# Patient Record
Sex: FEMALE | Race: WHITE | Employment: UNEMPLOYED | URBAN - METROPOLITAN AREA
[De-identification: names, ages, dates, MRNs, and addresses within clinical notes are randomized per-mention and may not be internally consistent; named-entity substitution may affect disease eponyms.]

---

## 2017-04-07 ENCOUNTER — HOSPITAL ENCOUNTER (INPATIENT)
Age: 62
LOS: 3 days | Discharge: NURSING, REHAB OR LONG-TERM CARE | DRG: 305 | End: 2017-04-10
Attending: EMERGENCY MEDICINE | Admitting: INTERNAL MEDICINE
Payer: MEDICARE

## 2017-04-07 ENCOUNTER — APPOINTMENT (OUTPATIENT)
Dept: CT IMAGING | Age: 62
DRG: 305 | End: 2017-04-07
Attending: PHYSICIAN ASSISTANT
Payer: MEDICARE

## 2017-04-07 DIAGNOSIS — E87.1 HYPONATREMIA: Primary | ICD-10-CM

## 2017-04-07 PROBLEM — I16.0 HYPERTENSIVE URGENCY: Status: ACTIVE | Noted: 2017-04-07

## 2017-04-07 LAB
ANION GAP BLD CALC-SCNC: 15 MMOL/L (ref 5–15)
APPEARANCE UR: CLEAR
BILIRUB UR QL: NEGATIVE
BUN BLD-MCNC: 4 MG/DL (ref 9–20)
CA-I BLD-MCNC: 1.18 MMOL/L (ref 1.12–1.32)
CHLORIDE BLD-SCNC: 87 MMOL/L (ref 98–107)
CO2 BLD-SCNC: 28 MMOL/L (ref 21–32)
COLOR UR: NORMAL
CREAT BLD-MCNC: 0.4 MG/DL (ref 0.6–1.3)
GLUCOSE BLD-MCNC: 113 MG/DL (ref 65–100)
GLUCOSE UR STRIP.AUTO-MCNC: NEGATIVE MG/DL
HCT VFR BLD CALC: 44 % (ref 35–47)
HGB BLD-MCNC: 15 GM/DL (ref 11.5–16)
HGB UR QL STRIP: NEGATIVE
KETONES UR QL STRIP.AUTO: NEGATIVE MG/DL
LEUKOCYTE ESTERASE UR QL STRIP.AUTO: NEGATIVE
NITRITE UR QL STRIP.AUTO: NEGATIVE
PH UR STRIP: 7.5 [PH] (ref 5–8)
POTASSIUM BLD-SCNC: 3.5 MMOL/L (ref 3.5–5.1)
PROT UR STRIP-MCNC: NEGATIVE MG/DL
SERVICE CMNT-IMP: ABNORMAL
SODIUM BLD-SCNC: 125 MMOL/L (ref 136–145)
SODIUM UR-SCNC: 25 MMOL/L
SP GR UR REFRACTOMETRY: 1.01 (ref 1–1.03)
UROBILINOGEN UR QL STRIP.AUTO: 0.2 EU/DL (ref 0.2–1)

## 2017-04-07 PROCEDURE — 80047 BASIC METABLC PNL IONIZED CA: CPT

## 2017-04-07 PROCEDURE — 96360 HYDRATION IV INFUSION INIT: CPT

## 2017-04-07 PROCEDURE — 74011250636 HC RX REV CODE- 250/636: Performed by: PHYSICIAN ASSISTANT

## 2017-04-07 PROCEDURE — 70450 CT HEAD/BRAIN W/O DYE: CPT

## 2017-04-07 PROCEDURE — 93005 ELECTROCARDIOGRAM TRACING: CPT

## 2017-04-07 PROCEDURE — 65660000000 HC RM CCU STEPDOWN

## 2017-04-07 PROCEDURE — 74011250636 HC RX REV CODE- 250/636: Performed by: INTERNAL MEDICINE

## 2017-04-07 PROCEDURE — 99284 EMERGENCY DEPT VISIT MOD MDM: CPT

## 2017-04-07 PROCEDURE — 81003 URINALYSIS AUTO W/O SCOPE: CPT | Performed by: PHYSICIAN ASSISTANT

## 2017-04-07 PROCEDURE — 84484 ASSAY OF TROPONIN QUANT: CPT | Performed by: INTERNAL MEDICINE

## 2017-04-07 PROCEDURE — 83930 ASSAY OF BLOOD OSMOLALITY: CPT | Performed by: INTERNAL MEDICINE

## 2017-04-07 PROCEDURE — 74011250637 HC RX REV CODE- 250/637: Performed by: INTERNAL MEDICINE

## 2017-04-07 PROCEDURE — 36415 COLL VENOUS BLD VENIPUNCTURE: CPT | Performed by: INTERNAL MEDICINE

## 2017-04-07 PROCEDURE — 84300 ASSAY OF URINE SODIUM: CPT | Performed by: PHYSICIAN ASSISTANT

## 2017-04-07 RX ORDER — CLORAZEPATE DIPOTASSIUM 3.75 MG/1
3.75 TABLET ORAL 3 TIMES DAILY
Status: DISCONTINUED | OUTPATIENT
Start: 2017-04-07 | End: 2017-04-10 | Stop reason: HOSPADM

## 2017-04-07 RX ORDER — MONTELUKAST SODIUM 10 MG/1
10 TABLET ORAL DAILY
Status: DISCONTINUED | OUTPATIENT
Start: 2017-04-08 | End: 2017-04-10 | Stop reason: HOSPADM

## 2017-04-07 RX ORDER — SODIUM CHLORIDE 0.9 % (FLUSH) 0.9 %
5-10 SYRINGE (ML) INJECTION AS NEEDED
Status: DISCONTINUED | OUTPATIENT
Start: 2017-04-07 | End: 2017-04-10 | Stop reason: HOSPADM

## 2017-04-07 RX ORDER — FAMOTIDINE 20 MG/1
20 TABLET, FILM COATED ORAL DAILY
Status: DISCONTINUED | OUTPATIENT
Start: 2017-04-08 | End: 2017-04-10 | Stop reason: HOSPADM

## 2017-04-07 RX ORDER — LORAZEPAM 0.5 MG/1
0.5 TABLET ORAL
Status: DISCONTINUED | OUTPATIENT
Start: 2017-04-07 | End: 2017-04-10 | Stop reason: HOSPADM

## 2017-04-07 RX ORDER — MUPIROCIN 20 MG/G
OINTMENT TOPICAL 2 TIMES DAILY
COMMUNITY
End: 2017-04-10

## 2017-04-07 RX ORDER — THERA TABS 400 MCG
1 TAB ORAL DAILY
Status: DISCONTINUED | OUTPATIENT
Start: 2017-04-08 | End: 2017-04-10 | Stop reason: HOSPADM

## 2017-04-07 RX ORDER — ACETAMINOPHEN 325 MG/1
325 TABLET ORAL
COMMUNITY

## 2017-04-07 RX ORDER — ATORVASTATIN CALCIUM 20 MG/1
20 TABLET, FILM COATED ORAL
Status: DISCONTINUED | OUTPATIENT
Start: 2017-04-07 | End: 2017-04-10 | Stop reason: HOSPADM

## 2017-04-07 RX ORDER — AMLODIPINE BESYLATE 5 MG/1
5 TABLET ORAL DAILY
Status: DISCONTINUED | OUTPATIENT
Start: 2017-04-07 | End: 2017-04-10

## 2017-04-07 RX ORDER — THERA TABS 400 MCG
1 TAB ORAL DAILY
COMMUNITY

## 2017-04-07 RX ORDER — ACETAMINOPHEN 325 MG/1
325 TABLET ORAL
Status: DISCONTINUED | OUTPATIENT
Start: 2017-04-07 | End: 2017-04-10 | Stop reason: HOSPADM

## 2017-04-07 RX ORDER — ENOXAPARIN SODIUM 100 MG/ML
40 INJECTION SUBCUTANEOUS EVERY 24 HOURS
Status: DISCONTINUED | OUTPATIENT
Start: 2017-04-07 | End: 2017-04-10 | Stop reason: HOSPADM

## 2017-04-07 RX ORDER — SODIUM CHLORIDE 9 MG/ML
1000 INJECTION, SOLUTION INTRAVENOUS CONTINUOUS
Status: DISCONTINUED | OUTPATIENT
Start: 2017-04-07 | End: 2017-04-08

## 2017-04-07 RX ORDER — PAROXETINE HYDROCHLORIDE 20 MG/1
20 TABLET, FILM COATED ORAL
Status: DISCONTINUED | OUTPATIENT
Start: 2017-04-07 | End: 2017-04-10 | Stop reason: HOSPADM

## 2017-04-07 RX ORDER — CETIRIZINE HCL 10 MG
10 TABLET ORAL DAILY
COMMUNITY

## 2017-04-07 RX ORDER — CETIRIZINE HCL 10 MG
10 TABLET ORAL DAILY
Status: DISCONTINUED | OUTPATIENT
Start: 2017-04-08 | End: 2017-04-10 | Stop reason: HOSPADM

## 2017-04-07 RX ORDER — BENZONATATE 100 MG/1
100 CAPSULE ORAL
COMMUNITY
End: 2017-04-07

## 2017-04-07 RX ORDER — LORAZEPAM 0.5 MG/1
0.5 TABLET ORAL 2 TIMES DAILY
Status: DISCONTINUED | OUTPATIENT
Start: 2017-04-07 | End: 2017-04-08

## 2017-04-07 RX ORDER — SODIUM CHLORIDE 0.9 % (FLUSH) 0.9 %
5-10 SYRINGE (ML) INJECTION EVERY 8 HOURS
Status: DISCONTINUED | OUTPATIENT
Start: 2017-04-07 | End: 2017-04-10 | Stop reason: HOSPADM

## 2017-04-07 RX ORDER — RANITIDINE 150 MG/1
150 CAPSULE ORAL
COMMUNITY

## 2017-04-07 RX ORDER — LISINOPRIL 5 MG/1
5 TABLET ORAL DAILY
Status: DISCONTINUED | OUTPATIENT
Start: 2017-04-07 | End: 2017-04-10

## 2017-04-07 RX ORDER — HYDRALAZINE HYDROCHLORIDE 20 MG/ML
20 INJECTION INTRAMUSCULAR; INTRAVENOUS
Status: DISCONTINUED | OUTPATIENT
Start: 2017-04-07 | End: 2017-04-08

## 2017-04-07 RX ORDER — METOPROLOL SUCCINATE 25 MG/1
25 TABLET, EXTENDED RELEASE ORAL DAILY
Status: DISCONTINUED | OUTPATIENT
Start: 2017-04-08 | End: 2017-04-10 | Stop reason: HOSPADM

## 2017-04-07 RX ORDER — LORAZEPAM 0.5 MG/1
0.5 TABLET ORAL 2 TIMES DAILY
Status: ON HOLD | COMMUNITY
End: 2017-04-09

## 2017-04-07 RX ADMIN — SODIUM CHLORIDE 1000 ML: 900 INJECTION, SOLUTION INTRAVENOUS at 19:01

## 2017-04-07 RX ADMIN — Medication 10 ML: at 22:06

## 2017-04-07 RX ADMIN — ATORVASTATIN CALCIUM 20 MG: 20 TABLET, FILM COATED ORAL at 22:03

## 2017-04-07 RX ADMIN — LISINOPRIL 5 MG: 5 TABLET ORAL at 18:59

## 2017-04-07 RX ADMIN — ACETAMINOPHEN 325 MG: 325 TABLET, FILM COATED ORAL at 23:05

## 2017-04-07 RX ADMIN — LORAZEPAM 0.5 MG: 0.5 TABLET ORAL at 22:03

## 2017-04-07 RX ADMIN — SODIUM CHLORIDE 1000 ML: 900 INJECTION, SOLUTION INTRAVENOUS at 22:09

## 2017-04-07 RX ADMIN — AMLODIPINE BESYLATE 5 MG: 5 TABLET ORAL at 19:00

## 2017-04-07 RX ADMIN — ENOXAPARIN SODIUM 40 MG: 40 INJECTION SUBCUTANEOUS at 22:03

## 2017-04-07 RX ADMIN — HYDRALAZINE HYDROCHLORIDE 20 MG: 20 INJECTION INTRAMUSCULAR; INTRAVENOUS at 22:03

## 2017-04-07 RX ADMIN — PAROXETINE HYDROCHLORIDE 20 MG: 20 TABLET, FILM COATED ORAL at 22:03

## 2017-04-07 NOTE — IP AVS SNAPSHOT
Current Discharge Medication List  
  
START taking these medications Dose & Instructions Dispensing Information Comments Morning Noon Evening Bedtime  
 amLODIPine 10 mg tablet Commonly known as:  Parvin Jimenes Your last dose was: Your next dose is:    
   
   
 Dose:  10 mg Take 1 Tab by mouth daily. Hold for SBP<100 or DBP<50 Quantity:  30 Tab Refills:  0 CONTINUE these medications which have CHANGED Dose & Instructions Dispensing Information Comments Morning Noon Evening Bedtime * LORazepam 0.5 mg tablet Commonly known as:  ATIVAN What changed:  when to take this Your last dose was: Your next dose is:    
   
   
 Dose:  0.5 mg Take 1 Tab by mouth three (3) times daily. Max Daily Amount: 1.5 mg.  
 Quantity:  90 Tab Refills:  0  
     
   
   
   
  
 * LORazepam 0.5 mg tablet Commonly known as:  ATIVAN What changed: You were already taking a medication with the same name, and this prescription was added. Make sure you understand how and when to take each. Your last dose was: Your next dose is:    
   
   
 Dose:  0.5 mg Take 1 Tab by mouth two (2) times daily as needed. Max Daily Amount: 1 mg. Quantity:  30 Tab Refills:  0  
     
   
   
   
  
 * Notice: This list has 2 medication(s) that are the same as other medications prescribed for you. Read the directions carefully, and ask your doctor or other care provider to review them with you. CONTINUE these medications which have NOT CHANGED Dose & Instructions Dispensing Information Comments Morning Noon Evening Bedtime  
 acetaminophen 325 mg tablet Commonly known as:  TYLENOL Your last dose was: Your next dose is:    
   
   
 Dose:  325 mg Take 325 mg by mouth daily as needed for Pain. Refills:  0  
     
   
   
   
  
 cetirizine 10 mg tablet Commonly known as:  ZYRTEC  
   
 Your last dose was: Your next dose is:    
   
   
 Dose:  10 mg Take 10 mg by mouth daily. Refills:  0  
     
   
   
   
  
 clorazepate 7.5 mg tablet Commonly known as:  TRANXENE Your last dose was: Your next dose is:    
   
   
 Dose:  3.75 mg Take 3.75 mg by mouth three (3) times daily. Refills:  0 LIVALO 2 mg tablet Generic drug:  pitavastatin Your last dose was: Your next dose is:    
   
   
 Dose:  2 mg Take 2 mg by mouth nightly. Refills:  0 NEILMED SINUS RINSE COMPLETE NA Your last dose was: Your next dose is:    
   
   
 by Nasal route two (2) times a day. Refills:  0 PAXIL 20 mg tablet Generic drug:  PARoxetine Your last dose was: Your next dose is:    
   
   
 Dose:  20 mg Take 20 mg by mouth nightly. Refills:  0  
     
   
   
   
  
 raNITIdine hcl 150 mg capsule Your last dose was: Your next dose is:    
   
   
 Dose:  150 mg Take 150 mg by mouth nightly. Refills:  0 SINGULAIR 10 mg tablet Generic drug:  montelukast  
   
Your last dose was: Your next dose is:    
   
   
 Dose:  10 mg Take 10 mg by mouth daily. Refills:  0  
     
   
   
   
  
 therapeutic multivitamin tablet Commonly known as:  [facility] Your last dose was: Your next dose is:    
   
   
 Dose:  1 Tab Take 1 Tab by mouth daily. Refills:  0  
     
   
   
   
  
 TOPROL XL 25 mg XL tablet Generic drug:  metoprolol succinate Your last dose was: Your next dose is:    
   
   
 Dose:  25 mg Take 25 mg by mouth daily. Refills:  0 STOP taking these medications   
 mupirocin 2 % ointment Commonly known as:  [facility] Where to Get Your Medications Information on where to get these meds will be given to you by the nurse or doctor. ! Ask your nurse or doctor about these medications  
  amLODIPine 10 mg tablet LORazepam 0.5 mg tablet LORazepam 0.5 mg tablet

## 2017-04-07 NOTE — BSMART NOTE
Comprehensive Assessment Form Part 1      Section I - Disposition    Axis I - Generalized Anxiety D/O   Axis II - 799. 9(Dependent PD traits)  Axis III -   Past Medical History:   Diagnosis Date    Arthritis     Asthma     Hypertension     Psychiatric disorder     anxiety and panic attacks     Axis IV - Social, Medical  [place] V - 55      The Medical Doctor to Psychiatrist conference was not completed. The Medical Doctor is in agreement with Psychiatrist disposition because of (reason) patient doesn't warrant inpatient care. The plan is d/c home and f/u with Dr. Ayala Benson on 4/11 @ 3p. Provided various strategies to assist in managing her anxiety and encouraged  to continue working as planned and giving patient resources to care for herself during this time. Advised patietn and  to not make ANY additional changes to Rx without first talking to Dr. Ayala Benson as they are getting directions from others and getting confused. Pt's biggest issues seem linked to anxiety and fear of being alone. The on-call Psychiatrist consulted was Dr. Shefali Claudio. The admitting Psychiatrist will be Dr. Shefali Claudio. The admitting Diagnosis is NA. The Payor source is ARON Aleman Section II - Integrated Summary  Summary:  Patient presents to ER seeking help for her anxiety. She's visited [facility] 2x in the past 2wks and has been to [facility]x due to her anxiety and weakness. Goes in depth discussing her various physical complaints including lightheadedness, weakness, increasing urinary frequency, increasing anxiety, SOB, and poor balance. She believes this is all due to recent changes in her Rx. Shared that she saw Dr. Manfred Anthony for over 10yrs and that upon his long term she had to find another psych. Saw Dr. Ayala Benson for the first time approx 2wks ago. At that time her Tranxene was reduced(with plan to d/c), her Ativan was reduced to .5mg BID from 1mg.  Shared that in addition she was Rx Depakote 125mg qhs \"but I can't take that because it has made me dizzy\" and Paxil 20mg. Patient reports that she's unable to care for herself at home now due to the above mentioned symptoms. Says her  can't care for her as he has his own health issues and has a job. Talked with patient's  who shared that he's fearful of leaving her home alone due to fear of falling. Shared that he's been ensuring she takes Rx as ordered but feels that she needs to change dose saying \"the decrease happened to fast\". She denies any SI/HI. She denies any AH/VH. Patient seems fixated on \"changes happening too fast-maybe I need a new Psych\". In addition she talks about concerns regarding meal preparation \"I'm too weak\" and what to do when her  goes to work. The patienthas demonstrated mental capacity to provide informed consent. The information is given by the patient and spouse/SO. The Chief Complaint is \"I'm just anxious and weak\". The Precipitant Factors are changes in Rx, limited coping skills. Previous Hospitalizations: none  The patient has not previously been in restraints. Current Psychiatrist and/or  is Dr. Orion Osorio. Lethality Assessment:    The potential for suicide noted by the following: not noted . The potential for homicide is not noted. The patient has not been a perpetrator of sexual or physical abuse. There are not pending charges. The patient is not felt to be at risk for self harm or harm to others. The attending nurse was advised . Section III - Psychosocial  The patient's overall mood and attitude is cooperative. Feelings of helplessness and hopelessness are not observed. Generalized anxiety is observed by patient is fixated on somatic symtoms, fears, and health. Panic is observed by patient report. Phobias are not observed. Obsessive compulsive tendencies are not observed. Section IV - Mental Status Exam  The patient's appearance shows no evidence of impairment.   The patient's behavior shows no evidence of impairment. The patient is oriented to time, place, person and situation. The patient's speech shows no evidence of impairment. The patient's mood is anxious. The range of affect shows no evidence of impairment. The patient's thought content demonstrates no evidence of impairment. The thought process shows no evidence of impairment. The patient's perception shows no evidence of impairment. The patient's memory shows no evidence of impairment. The patient's appetite shows no evidence of impairment. The patient's sleep shows no evidence of impairment. The patient's insight shows no evidence of impairment. The patient's judgement shows no evidence of impairment. Section V - Substance Abuse  The patient is not using substances. Section VI - Living Arrangements  The patient is . The spouses approximate age is 63's and appears to be in fair health. The patient lives with a spouse. The patient has no children. The patient does plan to return home upon discharge. The patient does not have legal issues pending. The patient's source of income comes from social security. Anabaptist and cultural practices have not been voiced at this time. The patient's greatest support comes from  and this person will be involved with the treatment. The patient has not been in an event described as horrible or outside the realm of ordinary life experience either currently or in the past.  The patient has not been a victim of sexual/physical abuse. Section VII - Other Areas of Clinical Concern  The highest grade achieved is unk with the overall quality of school experience being described as NA. The patient is currently unemployed and speaks [place] as a primary language. The patient has no communication impairments affecting communication. The patient's preference for learning can be described as: can read and write adequately.   The patient's hearing is normal.  The patient's vision is normal.      Jorge Luis Cam, LPC

## 2017-04-07 NOTE — H&P
History & Physical    Primary Care Provider: MICHELLE Cardenas  Source of Information: Patient and chart      Prior Psychiatrist:  Susan Starks  Current    Psychiatrist :Najma Granado  History of Presenting Illness:     Loulou Corcoran is a 62 y.o. female who presents with: dizziness and  Elevated blood pressure, pt feels that her main problem is anxiety 2n2 to her most recent psychiatrist reducing her anxiety medicine (  Pt was on ativan 1 mg bid now 0.5 mg bid and was on Tranxene 7.5 mg tid and placed on 3.75 mg tid.  )  Pt w/o cp, sob but notes racing of heart occassionally. Mendel [place] no n/v/d/fever but did have loos stools x 1 today. No LOC, no seizure, no cp, no sob. Review of Systems:    ·  constitutional Dizziness, weakness. No headache ·  genitourinary Bladder spasm     ·  eyes occ blurry ·  musculoskeletal Rt ankle pain off and on    ·  ENT: --ears neg   ·  neurologic  neg      --nose/sinuses neg   ·  psychiatric Anxiety       --mouth/thorat neg   ·  endocrine neg      --neck neg   ·  peripheral vascular neg     ·  respiratory neg   ·  Skin/breast neg     ·  cardiac occ races ·  Hematologic/lymph neg     ·  gastroentestinal Neg  ·  Allergy/immunologic neg              Past, Family, and/or Social History [facility])    Past Medical History:   Diagnosis Date    Arthritis     Asthma     Hypertension     Psychiatric disorder     anxiety and panic attacks      Past Surgical History:   Procedure Laterality Date    [place] INCISE/CRYOSURG LESN BLADDER       Prior to Admission medications    Medication Sig Start Date End Date Taking? Authorizing Provider   LORazepam (ATIVAN) 0.5 mg tablet Take 0.5 mg by mouth two (2) times a day. Yes Historical Provider   pitavastatin (LIVALO) 2 mg tablet Take 2 mg by mouth nightly. Yes Historical Provider   mupirocin (BACTROBAN) 2 % ointment by Both Nostrils route two (2) times a day.    Yes Historical Provider   SOD CHLOR,BICARB/SQUEEZ BOTTLE (NEILMED SINUS RINSE COMPLETE NA) by Nasal route two (2) times a day. Yes Historical Provider   acetaminophen (TYLENOL) 325 mg tablet Take 325 mg by mouth daily as needed for Pain. Yes Historical Provider   raNITIdine hcl 150 mg capsule Take 150 mg by mouth nightly. Yes Historical Provider   cetirizine (ZYRTEC) 10 mg tablet Take 10 mg by mouth daily. Yes Historical Provider   therapeutic multivitamin (THERAGRAN) tablet Take 1 Tab by mouth daily. Yes Historical Provider   PARoxetine (PAXIL) 20 mg tablet Take 20 mg by mouth nightly. Yes Jaylan Lopez MD   montelukast (SINGULAIR) 10 mg tablet Take 10 mg by mouth daily. Yes Jaylan Lopez MD   metoprolol succinate (TOPROL XL) 25 mg XL tablet Take 25 mg by mouth daily. Yes Jaylan Lopez MD   clorazepate (TRANXENE) 7.5 mg tablet Take 3.75 mg by mouth three (3) times daily. Yes Jaylan Lopez MD     Allergies   Allergen Reactions    Alcohol Other (comments)     Pt stated it would knock her on the floor, pt stated she tested positive     Tetracycline Other (comments)     vomiting      History reviewed. No pertinent family history. SOCIAL HISTORY:  Patient resides:  Independently x   Assisted Living    SNF    With family care       Smoking history:   None x   Former    Chronic      Alcohol history:   None x   Social    Chronic      Patient ambulates:  Independently x   With Cane x   With [employer] walker    With WC             Objective:       Physical Exam:           Visit Vitals    BP (!) 131/102    Pulse 77    Temp 98.6 °F (37 °C)    Resp 15    Ht 5' 2\" (1.575 m)    Wt 50.9 kg (112 lb 3.4 oz)    SpO2 96%    BMI 20.52 kg/m2      O2 Device: Room air             Data Review:     Recent Days:  No results for input(s): WBC, HGB, HCT, PLT, HGBEXT, HCTEXT, PLTEXT in the last 72 hours. No results for input(s): NA, K, CL, CO2, GLU, BUN, CREA, CA, MG, PHOS, ALB, TBIL, TBILI, SGOT, ALT, INR in the last 72 hours.     No lab exists for component: INREXT  No results for input(s): PH, PCO2, PO2, HCO3, FIO2 in the last 72 hours. 24 Hour Results:  Recent Results (from the past 24 hour(s))   POC CHEM8    Collection Time: 04/07/17  3:43 PM   Result Value Ref Range    Calcium, ionized (POC) 1.18 1.12 - 1.32 MMOL/L    Sodium (POC) 125 (L) 136 - 145 MMOL/L    Potassium (POC) 3.5 3.5 - 5.1 MMOL/L    Chloride (POC) 87 (L) 98 - 107 MMOL/L    CO2 (POC) 28 21 - 32 MMOL/L    Anion gap (POC) 15 5 - 15 mmol/L    Glucose (POC) 113 (H) 65 - 100 MG/DL    BUN (POC) 4 (L) 9 - 20 MG/DL    Creatinine (POC) 0.4 (L) 0.6 - 1.3 MG/DL    GFR-AA (POC) >60 >60 ml/min/1.73m2    GFR, non-AA (POC) >60 >60 ml/min/1.73m2    Hemoglobin (POC) 15.0 11.5 - 16.0 GM/DL    Hematocrit (POC) 44 35.0 - 47.0 %    Comment Comment Not Indicated. URINALYSIS W/ RFLX MICROSCOPIC    Collection Time: 04/07/17  5:09 PM   Result Value Ref Range    Color YELLOW/STRAW      Appearance CLEAR CLEAR      Specific gravity 1.007 1.003 - 1.030      pH (UA) 7.5 5.0 - 8.0      Protein NEGATIVE  NEG mg/dL    Glucose NEGATIVE  NEG mg/dL    Ketone NEGATIVE  NEG mg/dL    Bilirubin NEGATIVE  NEG      Blood NEGATIVE  NEG      Urobilinogen 0.2 0.2 - 1.0 EU/dL    Nitrites NEGATIVE  NEG      Leukocyte Esterase NEGATIVE  NEG             Old records: Old records reviewed: = yes ;    Assessment: and Plan       Pt with new problem to me  y   Pt and her problems are new to me  y   Life threatening issue is  y            Impression    · Hypertensive urgency ; Increased bp management meds and iv meds as needed. · Increased dizziness felt 2nd to above. Will need PT/OT eval    · Chronic anxiety on a newed regimen. Pt feels inadequate     For psychiatry eval    · Irritable bladder   Cont home detrol.               Code: ull   PPX:  Lovenox   Ambulation status PTA:  variable up with walker but had to craw recently on floor  Expected DC:  3 days   Come form:  Home   Will need PT:  y  Will need OT: y  Will need Speech: n  Will need Case: y               Signed By: Tika Christopher MD     April 7, 2017

## 2017-04-07 NOTE — ED NOTES
Pt resting on stretcher waiting for food tray. No s/s of acute distress. Call bell within reach. Will continue to monitor.

## 2017-04-07 NOTE — PROGRESS NOTES
Admission Medication Reconciliation:    Information obtained from: Patient, Medication bottles, Rx Query    Significant PMH/Disease States:   Past Medical History:   Diagnosis Date    Arthritis     Asthma     Hypertension     Psychiatric disorder     anxiety and panic attacks       Chief Complaint for this Admission:  Anxiety, panic attack    Allergies:  Alcohol and Tetracycline    Prior to Admission Medications:   Prior to Admission Medications   Prescriptions Last Dose Informant Patient Reported? Taking? LORazepam (ATIVAN) 0.5 mg tablet   Yes Yes   Sig: Take 0.5 mg by mouth two (2) times a day. PARoxetine (PAXIL) 20 mg tablet   Yes Yes   Sig: Take 20 mg by mouth nightly. SOD CHLOR,BICARB/SQUEEZ BOTTLE (NEILMED SINUS RINSE COMPLETE NA)   Yes Yes   Sig: by Nasal route two (2) times a day. acetaminophen (TYLENOL) 325 mg tablet   Yes Yes   Sig: Take 325 mg by mouth daily as needed for Pain. cetirizine (ZYRTEC) 10 mg tablet   Yes Yes   Sig: Take 10 mg by mouth daily. clorazepate (TRANXENE) 7.5 mg tablet   Yes Yes   Sig: Take 3.75 mg by mouth three (3) times daily. metoprolol succinate (TOPROL XL) 25 mg XL tablet   Yes Yes   Sig: Take 25 mg by mouth daily. montelukast (SINGULAIR) 10 mg tablet   Yes Yes   Sig: Take 10 mg by mouth daily. mupirocin (BACTROBAN) 2 % ointment   Yes Yes   Sig: by Both Nostrils route two (2) times a day. pitavastatin (LIVALO) 2 mg tablet   Yes Yes   Sig: Take 2 mg by mouth nightly. raNITIdine hcl 150 mg capsule   Yes Yes   Sig: Take 150 mg by mouth nightly. therapeutic multivitamin [facility]) tablet   Yes Yes   Sig: Take 1 Tab by mouth daily. Facility-Administered Medications: None         Comments/Recommendations: PTA med list updated via information provided by the patient, medications, and Rx Query. 1) Per patient, her psychiatrist wanted to wean her off clorazepate (was previously on 7.5 mg TID).   She is currently taking 3.75 mg TID but said she is unable to wean off completely due to her anxiety and said her psychiatrist will not give her a refill until she sees them. 2) Patient's bottle of metoprolol XL states 2 tabs PO every day but the patient states that is incorrect and she only takes 2 tablet PO every day.

## 2017-04-07 NOTE — ED PROVIDER NOTES
HPI Comments: 11year-old female history of anxiety, GERD who presents with dizziness and difficulty walking. Patient notes dizziness improves when she is still but when she attempts to move she becomes quite dizzy she's been crawling on the ground at home. As in the side patient also requesting someone to talk to her psychiatrist as her current anxiety regimen has been changed in recent weeks has been cutting back on her Ativan and Livalo. Patient is a 62 y.o. female presenting with dizziness. Dizziness          Past Medical History:   Diagnosis Date    Arthritis     Asthma     Hypertension     Psychiatric disorder     anxiety and panic attacks       Past Surgical History:   Procedure Laterality Date    [place] INCISE/CRYOSURG LESN BLADDER           History reviewed. No pertinent family history. Social History     Social History    Marital status:      Spouse name: N/A    Number of children: N/A    Years of education: N/A     Occupational History    Not on file. Social History Main Topics    Smoking status: Never Smoker    Smokeless tobacco: Not on file    Alcohol use No    Drug use: Not on file    Sexual activity: Not on file     Other Topics Concern    Not on file     Social History Narrative         ALLERGIES: Alcohol and Tetracycline    Review of Systems   Neurological: Positive for dizziness. Vitals:    04/07/17 1443   BP: (!) 161/96   Pulse: 76   Resp: 16   Temp: 97.7 °F (36.5 °C)   SpO2: 100%   Weight: 50.9 kg (112 lb 3.4 oz)   Height: 5' 2\" (1.575 m)            Physical Exam   Constitutional: She is oriented to person, place, and time. She appears well-developed and well-nourished. HENT:   Head: Normocephalic and atraumatic. Eyes: Conjunctivae and EOM are normal. Pupils are equal, round, and reactive to light. Neck: Normal range of motion. Neck supple. Cardiovascular: Normal rate, regular rhythm and normal heart sounds. No murmur heard.   Pulmonary/Chest: Effort normal and breath sounds normal.   Abdominal: Soft. There is no tenderness. Musculoskeletal: Normal range of motion. She exhibits no edema or tenderness. Neurological: She is alert and oriented to person, place, and time. Skin: Skin is warm and dry. Psychiatric: She has a normal mood and affect. Nursing note and vitals reviewed. MDM  Number of Diagnoses or Management Options  Hyponatremia:   Diagnosis management comments: 63 y/o F with dizziness - appears postural - Na 125     Admit symptomatic hyponatremia     ED EKG interpretation:  Rhythm: normal sinus rhythm; and regular . Rate (approx.): 76; Axis: normal; P wave: normal; QRS interval: normal ; ST/T wave: normal; in  Lead: ; Other findings: normal. This EKG was interpreted by MICHELLE Romero,ED Provider.       ED Course       Procedures

## 2017-04-07 NOTE — IP AVS SNAPSHOT
[address] [facility] [address] [facility] 
[phone] Patient: Angela Roach MRN: [id number] AHX:[birth year] You are allergic to the following Allergen Reactions Sulfa (Sulfonamide Antibiotics) Other (comments) Body aches Alcohol Other (comments) Pt stated it would knock her on the floor, pt stated she tested positive Tetracycline Other (comments)  
 vomiting Recent Documentation Height Weight BMI OB Status Smoking Status 1.575 m 50.5 kg 20.36 kg/m2 Hysterectomy Never Smoker Emergency Contacts Name Discharge Info Relation Home Work Mobile Luzma Alvarez CAREGIVER [3] Spouse [3] [phone] Caty Barragan  Other Relative [6]   [phone] About your hospitalization You were admitted on:  April 7, 2017 You last received care in the[address] [facility] You were discharged on:  April 10, 2017 Unit phone number:  [phone] Why you were hospitalized Your primary diagnosis was:  Hypertensive Urgency Your diagnoses also included:  Anxiety Associated With Depression, Dizziness, Panic Anxiety Syndrome, Irritable Bladder, Hypokalemia, Hyponatremia Providers Seen During Your Hospitalizations Provider Role Specialty Primary office phone Latricia Gustafson MD Attending Provider Emergency Medicine [phone] Vani Weiss MD Attending Provider Internal Medicine [phone] Catherine Mahajan MD Attending Provider Internal Medicine [phone] Your Primary Care Physician (PCP) Primary Care Physician Office Phone Office Fax [phone], [place] [address] [phone] Follow-up Information Follow up With Details Comments Contact Info Ally Akers [place], [place] In 1 week hospital follow up [address] [facility] [address] [facility] [address] 
[phone] Monica Douglas MD In 1 week hospital follow up [address] [place] [address] 
[phone] Aneesh Starr MD  hospital follow up if want to change psychiatrist [address] [facility] [address] [facility] [place] 
[phone] Current Discharge Medication List  
  
START taking these medications Dose & Instructions Dispensing Information Comments Morning Noon Evening Bedtime  
 amLODIPine 10 mg tablet Commonly known as:  Massiel Byrd Your last dose was: Your next dose is:    
   
   
 Dose:  10 mg Take 1 Tab by mouth daily. Hold for SBP<100 or DBP<50 Quantity:  30 Tab Refills:  0 CONTINUE these medications which have CHANGED Dose & Instructions Dispensing Information Comments Morning Noon Evening Bedtime * LORazepam 0.5 mg tablet Commonly known as:  ATIVAN What changed:  when to take this Your last dose was: Your next dose is:    
   
   
 Dose:  0.5 mg Take 1 Tab by mouth three (3) times daily. Max Daily Amount: 1.5 mg.  
 Quantity:  90 Tab Refills:  0  
     
   
   
   
  
 * LORazepam 0.5 mg tablet Commonly known as:  ATIVAN What changed: You were already taking a medication with the same name, and this prescription was added. Make sure you understand how and when to take each. Your last dose was: Your next dose is:    
   
   
 Dose:  0.5 mg Take 1 Tab by mouth two (2) times daily as needed. Max Daily Amount: 1 mg. Quantity:  30 Tab Refills:  0  
     
   
   
   
  
 * Notice: This list has 2 medication(s) that are the same as other medications prescribed for you. Read the directions carefully, and ask your doctor or other care provider to review them with you. CONTINUE these medications which have NOT CHANGED Dose & Instructions Dispensing Information Comments Morning Noon Evening Bedtime  
 acetaminophen 325 mg tablet Commonly known as:  TYLENOL Your last dose was: Your next dose is:    
   
   
 Dose:  325 mg Take 325 mg by mouth daily as needed for Pain. Refills:  0  
     
   
   
   
  
 cetirizine 10 mg tablet Commonly known as:  ZYRTEC Your last dose was: Your next dose is:    
   
   
 Dose:  10 mg Take 10 mg by mouth daily. Refills:  0  
     
   
   
   
  
 clorazepate 7.5 mg tablet Commonly known as:  TRANXENE Your last dose was: Your next dose is:    
   
   
 Dose:  3.75 mg Take 3.75 mg by mouth three (3) times daily. Refills:  0 LIVALO 2 mg tablet Generic drug:  pitavastatin Your last dose was: Your next dose is:    
   
   
 Dose:  2 mg Take 2 mg by mouth nightly. Refills:  0 NEILMED SINUS RINSE COMPLETE NA Your last dose was: Your next dose is:    
   
   
 by Nasal route two (2) times a day. Refills:  0 PAXIL 20 mg tablet Generic drug:  PARoxetine Your last dose was: Your next dose is:    
   
   
 Dose:  20 mg Take 20 mg by mouth nightly. Refills:  0  
     
   
   
   
  
 raNITIdine hcl 150 mg capsule Your last dose was: Your next dose is:    
   
   
 Dose:  150 mg Take 150 mg by mouth nightly. Refills:  0 SINGULAIR 10 mg tablet Generic drug:  montelukast  
   
Your last dose was: Your next dose is:    
   
   
 Dose:  10 mg Take 10 mg by mouth daily. Refills:  0  
     
   
   
   
  
 therapeutic multivitamin tablet Commonly known as:  [facility] Your last dose was: Your next dose is:    
   
   
 Dose:  1 Tab Take 1 Tab by mouth daily. Refills:  0  
     
   
   
   
  
 TOPROL XL 25 mg XL tablet Generic drug:  metoprolol succinate Your last dose was: Your next dose is:    
   
   
 Dose:  25 mg Take 25 mg by mouth daily. Refills:  0 STOP taking these medications   
 mupirocin 2 % ointment Commonly known as:  [facility] Where to Get Your Medications Information on where to get these meds will be given to you by the nurse or doctor. ! Ask your nurse or doctor about these medications  
  amLODIPine 10 mg tablet LORazepam 0.5 mg tablet LORazepam 0.5 mg tablet Discharge Instructions ADDITIONAL CARE RECOMMENDATIONS:  
1. Take medications as prescribed. 2. Keep appointments as recommended/scheduled. 3. Please talk to your psychiatrist about adjusting your medications. The psychiatrist in the hospital recommended weaning off Paxil while starting and titrating up Zoloft on an outpatient basis. 4. Your psychiatric medications may be contributing to your low sodium levels due to dry mouth causing increased drinking of water. Please talk to your psychiatrist about adjusting your medications if needed. 5. Please check a basic metabolic panel (BMP) in 1 week. DIET: Low fat, Low cholesterol; please restrict water intake to less than 1500 ml per day ACTIVITY: PT/OT Eval and Treat WOUND CARE: none EQUIPMENT needed: as per PT/OT Anxiety Disorder: Care Instructions Your Care Instructions Anxiety is a normal reaction to stress. Difficult situations can cause you to have symptoms such as sweaty palms and a nervous feeling. In an anxiety disorder, the symptoms are far more severe. Constant worry, muscle tension, trouble sleeping, nausea and diarrhea, and other symptoms can make normal daily activities difficult or impossible. These symptoms may occur for no reason, and they can affect your work, school, or social life. Medicines, counseling, and self-care can all help. Follow-up care is a key part of your treatment and safety. Be sure to make and go to all appointments, and call your doctor if you are having problems.  It's also a good idea to know your test results and keep a list of the medicines you take. How can you care for yourself at home? · Take medicines exactly as directed. Call your doctor if you think you are having a problem with your medicine. · Go to your counseling sessions and follow-up appointments. · Recognize and accept your anxiety. Then, when you are in a situation that makes you anxious, say to yourself, \"This is not an emergency. I feel uncomfortable, but I am not in danger. I can keep going even if I feel anxious. \" · Be kind to your body: ¨ Relieve tension with exercise or a massage. ¨ Get enough rest. 
¨ Avoid alcohol, caffeine, nicotine, and illegal drugs. They can increase your anxiety level and cause sleep problems. ¨ Learn and do relaxation techniques. See below for more about these techniques. · Engage your mind. Get out and do something you enjoy. Go to a funny movie, or take a walk or hike. Plan your day. Having too much or too little to do can make you anxious. · Keep a record of your symptoms. Discuss your fears with a good friend or family member, or join a support group for people with similar problems. Talking to others sometimes relieves stress. · Get involved in social groups, or volunteer to help others. Being alone sometimes makes things seem worse than they are. · Get at least 30 minutes of exercise on most days of the week to relieve stress. Walking is a good choice. You also may want to do other activities, such as running, swimming, cycling, or playing tennis or team sports. Relaxation techniques Do relaxation exercises 10 to 20 minutes a day. You can play soothing, relaxing music while you do them, if you wish. · Tell others in your house that you are going to do your relaxation exercises. Ask them not to disturb you. · Find a comfortable place, away from all distractions and noise. · Lie down on your back, or sit with your back straight. · Focus on your breathing. Make it slow and steady. · Breathe in through your nose. Breathe out through either your nose or mouth. · Breathe deeply, filling up the area between your navel and your rib cage. Breathe so that your belly goes up and down. · Do not hold your breath. · Breathe like this for 5 to 10 minutes. Notice the feeling of calmness throughout your whole body. As you continue to breathe slowly and deeply, relax by doing the following for another 5 to 10 minutes: · Tighten and relax each muscle group in your body. You can begin at your toes and work your way up to your head. · Imagine your muscle groups relaxing and becoming heavy. · Empty your mind of all thoughts. · Let yourself relax more and more deeply. · Become aware of the state of calmness that surrounds you. · When your relaxation time is over, you can bring yourself back to alertness by moving your fingers and toes and then your hands and feet and then stretching and moving your entire body. Sometimes people fall asleep during relaxation, but they usually wake up shortly afterward. · Always give yourself time to return to full alertness before you drive a car or do anything that might cause an accident if you are not fully alert. Never play a relaxation tape while you drive a car. When should you call for help? Call 911 anytime you think you may need emergency care. For example, call if: 
· You feel you cannot stop from hurting yourself or someone else. Keep the numbers for these national suicide hotlines: 9-968-202-TALK (0-257.909.5828) and 6-670-EJOEKMI (8-242.629.9773). If you or someone you know talks about suicide or feeling hopeless, get help right away. Watch closely for changes in your health, and be sure to contact your doctor if: 
· You have anxiety or fear that affects your life. · You have symptoms of anxiety that are new or different from those you had before. Where can you learn more? Go to http://lucho-sarkis.info/. Enter P754 in the search box to learn more about \"Anxiety Disorder: Care Instructions. \" Current as of: July 26, 2016 Content Version: 11.2 © 4770-1023 [employer]iModel. Care instructions adapted under license by [employer] (which disclaims liability or warranty for this information). If you have questions about a medical condition or this instruction, always ask your healthcare professional. [address] any warranty or liability for your use of this information. Hyponatremia: Care Instructions Your Care Instructions Hyponatremia (say \"gq-mh-leg-TREE-brittany-uh\") means that you don't have enough sodium in your blood. It can cause nausea, vomiting, and headaches. Or you may not feel hungry. In serious cases, it can cause seizures, a coma, or even death. Hyponatremia is not a disease. It is a problem caused by something else, such as medicines or exercising for a long time in hot weather. You can get hyponatremia if you lose a lot of fluids and then you drink a lot of water or other liquids that don't have much sodium. You can also get it if you have kidney, liver, heart, or other health problems. Treatment is focused on getting your sodium levels back to normal. 
Follow-up care is a key part of your treatment and safety. Be sure to make and go to all appointments, and call your doctor if you are having problems. It's also a good idea to know your test results and keep a list of the medicines you take. How can you care for yourself at home? · If your doctor recommends it, drink fluids that have sodium. Sports drinks are a good choice. Or you can eat salty foods. · If your doctor recommends it, limit the amount of water you drink. And limit fluids that are mostly water. These include tea, coffee, and juice. · Take your medicines exactly as prescribed. Call your doctor if you have any problems with your medicine. · Get your sodium levels tested when your doctor tells you to. When should you call for help? Call 911 anytime you think you may need emergency care. For example, call if: 
· You have a seizure. · You passed out (lost consciousness). Call your doctor now or seek immediate medical care if: 
· You are confused or it is hard to focus. · You have little or no appetite. · You feel sick to your stomach or you vomit. · You have a headache. · You have mood changes. · You feel more tired than usual. 
Watch closely for changes in your health, and be sure to contact your doctor if: 
· You do not get better as expected. Where can you learn more? Go to http://lucho-sarkis.info/. Enter W274 in the search box to learn more about \"Hyponatremia: Care Instructions. \" Current as of: October 14, 2016 Content Version: 11.2 © 9776-5212 Fipeo. Care instructions adapted under license by [employer] (which disclaims liability or warranty for this information). If you have questions about a medical condition or this instruction, always ask your healthcare professional. [place] [address] any warranty or liability for your use of this information. Panic Attacks: Care Instructions Your Care Instructions During a panic attack, you may have a feeling of intense fear or terror, trouble breathing, chest pain or tightness, heartbeat changes, dizziness, sweating, and shaking. A panic attack starts suddenly and usually lasts from 5 to 20 minutes but may last even longer. You have the most anxiety about 10 minutes after the attack starts. An attack can begin with a stressful event, or it can happen without a cause. Although panic attacks can cause scary symptoms, you can learn to manage them with self-care, counseling, and medicine. Follow-up care is a key part of your treatment and safety.  Be sure to make and go to all appointments, and call your doctor if you are having problems. It's also a good idea to know your test results and keep a list of the medicines you take. How can you care for yourself at home? · Take your medicine exactly as directed. Call your doctor if you think you are having a problem with your medicine. · Go to your counseling sessions and follow-up appointments. · Recognize and accept your anxiety. Then, when you are in a situation that makes you anxious, say to yourself, \"This is not an emergency. I feel uncomfortable, but I am not in danger. I can keep going even if I feel anxious. \" · Be kind to your body: ¨ Relieve tension with exercise or a massage. ¨ Get enough rest. 
¨ Avoid alcohol, caffeine, nicotine, and illegal drugs. They can increase your anxiety level, cause sleep problems, or trigger a panic attack. ¨ Learn and do relaxation techniques. See below for more about these techniques. · Engage your mind. Get out and do something you enjoy. Go to a funny movie, or take a walk or hike. Plan your day. Having too much or too little to do can make you anxious. · Keep a record of your symptoms. Discuss your fears with a good friend or family member, or join a support group for people with similar problems. Talking to others sometimes relieves stress. · Get involved in social groups, or volunteer to help others. Being alone sometimes makes things seem worse than they are. · Get at least 30 minutes of exercise on most days of the week to relieve stress. Walking is a good choice. You also may want to do other activities, such as running, swimming, cycling, or playing tennis or team sports. Relaxation techniques Do relaxation exercises for 10 to 20 minutes a day. You can play soothing, relaxing music while you do them, if you wish. · Tell others in your house that you are going to do your relaxation exercises. Ask them not to disturb you. · Find a comfortable place, away from all distractions and noise. · Lie down on your back, or sit with your back straight. · Focus on your breathing. Make it slow and steady. · Breathe in through your nose. Breathe out through either your nose or mouth. · Breathe deeply, filling up the area between your navel and your rib cage. Breathe so that your belly goes up and down. · Do not hold your breath. · Breathe like this for 5 to 10 minutes. Notice the feeling of calmness throughout your whole body. As you continue to breathe slowly and deeply, relax by doing the following for another 5 to 10 minutes: · Tighten and relax each muscle group in your body. You can begin at your toes and work your way up to your head. · Imagine your muscle groups relaxing and becoming heavy. · Empty your mind of all thoughts. · Let yourself relax more and more deeply. · Become aware of the state of calmness that surrounds you. · When your relaxation time is over, you can bring yourself back to alertness by moving your fingers and toes and then your hands and feet and then stretching and moving your entire body. Sometimes people fall asleep during relaxation, but they usually wake up shortly afterward. · Always give yourself time to return to full alertness before you drive a car or do anything that might cause an accident if you are not fully alert. Never play a relaxation tape while driving a car. When should you call for help? Call 911 anytime you think you may need emergency care. For example, call if: 
· You feel you cannot stop from hurting yourself or someone else. Watch closely for changes in your health, and be sure to contact your doctor if: 
· Your panic attacks get worse. · You have new or different anxiety. · You are not getting better as expected. Where can you learn more? Go to http://lucho-sarkis.info/.  
Enter H601 in the search box to learn more about \"Panic Attacks: Care Instructions. \" Current as of: July 26, 2016 Content Version: 11.2 © 0627-5775 [employer]MedMatch. Care instructions adapted under license by [employer] (which disclaims liability or warranty for this information). If you have questions about a medical condition or this instruction, always ask your healthcare professional. [address] any warranty or liability for your use of this information. Discharge Instructions Attachments/References AMLODIPINE (BY MOUTH) (ENGLISH) Discharge Orders None ComputeNext Announcement We are excited to announce that we are making your provider's discharge notes available to you in ComputeNext. You will see these notes when they are completed and signed by the physician that discharged you from your recent hospital stay. If you have any questions or concerns about any information you see in ComputeNext, please call the Health Information Department where you were seen or reach out to your Primary Care Provider for more information about your plan of care. Introducing [facility]! Shane Senior introduces ComputeNext patient portal. Now you can access parts of your medical record, email your doctor's office, and request medication refills online. 1. In your internet browser, go to [email or web address] 2. Click on the First Time User? Click Here link in the Sign In box. You will see the New Member Sign Up page. 3. Enter your ComputeNext Access Code exactly as it appears below. You will not need to use this code after youve completed the sign-up process. If you do not sign up before the expiration date, you must request a new code. · ComputeNext Access Code: [email or web address] Expires: 7/6/2017  3:13 PM 
 
4. Enter the last four digits of your Social Security Number (xxxx) and Date of Birth (mm/dd/yyyy) as indicated and click Submit. You will be taken to the next sign-up page. 5. Create a [employer] ID. This will be your [employer] login ID and cannot be changed, so think of one that is secure and easy to remember. 6. Create a [employer] password. You can change your password at any time. 7. Enter your Password Reset Question and Answer. This can be used at a later time if you forget your password. 8. Enter your e-mail address. You will receive e-mail notification when new information is available in [address] 9. Click Sign Up. You can now view and download portions of your medical record. 10. Click the Download Summary menu link to download a portable copy of your medical information. If you have questions, please visit the Frequently Asked Questions section of the [employer] website. Remember, [employer] is NOT to be used for urgent needs. For medical emergencies, dial 911. Now available from your iPhone and Android! General Information Please provide this summary of care documentation to your next provider. Patient Signature:  ____________________________________________________________ Date:  ____________________________________________________________  
  
Dea Freed Provider Signature:  ____________________________________________________________ Date:  ____________________________________________________________ More Information Amlodipine (By mouth) Amlodipine (Sonia) Treats high blood pressure and angina (chest pain). This medicine is a calcium channel blocker. Brand Name(s):Norvasc There may be other brand names for this medicine. When This Medicine Should Not Be Used: This medicine is not right for everyone. Do not use it if you had an allergic reaction to amlodipine. How to Use This Medicine:  
Tablet, Dissolving Tablet · Take your medicine as directed. Your dose may need to be changed several times to find what works best for you. Take this medicine at the same time each day. · Read and follow the patient instructions that come with this medicine. Talk to your doctor or pharmacist if you have any questions. · Missed dose: Take a dose as soon as you remember. If it has been more than 12 hours since you were supposed to take your dose, skip the missed dose and take your next regular dose at the regular time. · Store the medicine in a closed container at room temperature, away from heat, moisture, and direct light. Drugs and Foods to Avoid: Ask your doctor or pharmacist before using any other medicine, including over-the-counter medicines, vitamins, and herbal products. · Some medicines can affect how amlodipine works. Tell your doctor if you are also using any of the following: ¨ Clarithromycin, cyclosporine, diltiazem, itraconazole, ritonavir, sildenafil, simvastatin, tacrolimus Warnings While Using This Medicine: · Tell your doctor if you are pregnant or breastfeeding, or if you have liver disease, heart disease, coronary artery disease, or aortic stenosis. · This medicine could lower your blood pressure too much, especially when you first use it or if you are dehydrated. Stand or sit up slowly if you feel lightheaded or dizzy. · Your doctor will check your progress and the effects of this medicine at regular visits. Keep all appointments. · Do not stop using this medicine without asking your doctor, even if you feel well. This medicine will not cure high blood pressure, but it will help keep it in normal range. You may have to take blood pressure medicine for the rest of your life. · Keep all medicine out of the reach of children. Never share your medicine with anyone. Possible Side Effects While Using This Medicine:  
Call your doctor right away if you notice any of these side effects: · Allergic reaction: Itching or hives, swelling in your face or hands, swelling or tingling in your mouth or throat, chest tightness, trouble breathing · Lightheadedness, dizziness · New or worsening chest pain · Swelling in your hands, ankles, or legs · Trouble breathing, nausea, unusual sweating, fainting If you notice other side effects that you think are caused by this medicine, tell your doctor. Call your doctor for medical advice about side effects. You may report side effects to FDA at 0-944-QVD-5586 © 2016 [address] is for End User's use only and may not be sold, redistributed or otherwise used for commercial purposes. The above information is an  only. It is not intended as medical advice for individual conditions or treatments. Talk to your doctor, nurse or pharmacist before following any medical regimen to see if it is safe and effective for you.

## 2017-04-07 NOTE — ED TRIAGE NOTES
Triage Note: Patient arrives by EMS from home after her  called 911 for \"anxiety and panic attack\" and hyperventilating at home. Patient reports her psychiatrist recently retired and her new doctor, Dr. Praful Sun, has \"halfed my dosages\" and \"I'm not coping well.'  Patient arrives alert and oriented to person, place, time and events. Patient was seen last night at [facility] for same and was told to make an appointment with her psychiatrist or see a new one.

## 2017-04-08 PROBLEM — F41.8 ANXIETY ASSOCIATED WITH DEPRESSION: Status: ACTIVE | Noted: 2017-04-08

## 2017-04-08 LAB
ANION GAP BLD CALC-SCNC: 12 MMOL/L (ref 5–15)
ATRIAL RATE: 76 BPM
BUN SERPL-MCNC: 4 MG/DL (ref 6–20)
BUN/CREAT SERPL: 7 (ref 12–20)
CALCIUM SERPL-MCNC: 8.7 MG/DL (ref 8.5–10.1)
CALCULATED P AXIS, ECG09: 29 DEGREES
CALCULATED R AXIS, ECG10: 53 DEGREES
CALCULATED T AXIS, ECG11: 69 DEGREES
CHLORIDE SERPL-SCNC: 98 MMOL/L (ref 97–108)
CO2 SERPL-SCNC: 22 MMOL/L (ref 21–32)
CREAT SERPL-MCNC: 0.55 MG/DL (ref 0.55–1.02)
DIAGNOSIS, 93000: NORMAL
ERYTHROCYTE [DISTWIDTH] IN BLOOD BY AUTOMATED COUNT: 13 % (ref 11.5–14.5)
GLUCOSE SERPL-MCNC: 91 MG/DL (ref 65–100)
HCT VFR BLD AUTO: 43.2 % (ref 35–47)
HGB BLD-MCNC: 14.6 G/DL (ref 11.5–16)
MCH RBC QN AUTO: 29 PG (ref 26–34)
MCHC RBC AUTO-ENTMCNC: 33.8 G/DL (ref 30–36.5)
MCV RBC AUTO: 85.7 FL (ref 80–99)
OSMOLALITY SERPL: 269 MOSM/KG H2O
P-R INTERVAL, ECG05: 140 MS
PLATELET # BLD AUTO: 332 K/UL (ref 150–400)
POTASSIUM SERPL-SCNC: 3.3 MMOL/L (ref 3.5–5.1)
Q-T INTERVAL, ECG07: 388 MS
QRS DURATION, ECG06: 76 MS
QTC CALCULATION (BEZET), ECG08: 436 MS
RBC # BLD AUTO: 5.04 M/UL (ref 3.8–5.2)
SODIUM SERPL-SCNC: 132 MMOL/L (ref 136–145)
TROPONIN I SERPL-MCNC: <0.04 NG/ML
VENTRICULAR RATE, ECG03: 76 BPM
WBC # BLD AUTO: 9.5 K/UL (ref 3.6–11)

## 2017-04-08 PROCEDURE — 65270000029 HC RM PRIVATE

## 2017-04-08 PROCEDURE — 80048 BASIC METABOLIC PNL TOTAL CA: CPT | Performed by: INTERNAL MEDICINE

## 2017-04-08 PROCEDURE — 85027 COMPLETE CBC AUTOMATED: CPT | Performed by: INTERNAL MEDICINE

## 2017-04-08 PROCEDURE — 36415 COLL VENOUS BLD VENIPUNCTURE: CPT | Performed by: INTERNAL MEDICINE

## 2017-04-08 PROCEDURE — 74011250636 HC RX REV CODE- 250/636: Performed by: INTERNAL MEDICINE

## 2017-04-08 PROCEDURE — 74011250637 HC RX REV CODE- 250/637: Performed by: INTERNAL MEDICINE

## 2017-04-08 PROCEDURE — 74011250637 HC RX REV CODE- 250/637: Performed by: NURSE PRACTITIONER

## 2017-04-08 RX ORDER — LORAZEPAM 0.5 MG/1
0.5 TABLET ORAL 3 TIMES DAILY
Status: DISCONTINUED | OUTPATIENT
Start: 2017-04-08 | End: 2017-04-10 | Stop reason: HOSPADM

## 2017-04-08 RX ORDER — POTASSIUM CHLORIDE 750 MG/1
40 TABLET, FILM COATED, EXTENDED RELEASE ORAL ONCE
Status: COMPLETED | OUTPATIENT
Start: 2017-04-08 | End: 2017-04-08

## 2017-04-08 RX ORDER — SODIUM CHLORIDE 9 MG/ML
75 INJECTION, SOLUTION INTRAVENOUS CONTINUOUS
Status: DISCONTINUED | OUTPATIENT
Start: 2017-04-08 | End: 2017-04-10

## 2017-04-08 RX ADMIN — ATORVASTATIN CALCIUM 20 MG: 20 TABLET, FILM COATED ORAL at 22:03

## 2017-04-08 RX ADMIN — METOPROLOL SUCCINATE 25 MG: 25 TABLET, EXTENDED RELEASE ORAL at 08:37

## 2017-04-08 RX ADMIN — LORAZEPAM 0.5 MG: 0.5 TABLET ORAL at 10:56

## 2017-04-08 RX ADMIN — ENOXAPARIN SODIUM 40 MG: 40 INJECTION SUBCUTANEOUS at 22:12

## 2017-04-08 RX ADMIN — Medication 10 ML: at 22:04

## 2017-04-08 RX ADMIN — CLORAZEPATE DIPOTASSIUM 3.75 MG: 3.75 TABLET ORAL at 22:03

## 2017-04-08 RX ADMIN — LISINOPRIL 5 MG: 5 TABLET ORAL at 08:38

## 2017-04-08 RX ADMIN — LORAZEPAM 0.5 MG: 0.5 TABLET ORAL at 17:24

## 2017-04-08 RX ADMIN — THERA TABS 1 TABLET: TAB at 08:37

## 2017-04-08 RX ADMIN — CLORAZEPATE DIPOTASSIUM 3.75 MG: 3.75 TABLET ORAL at 17:23

## 2017-04-08 RX ADMIN — Medication 10 ML: at 17:25

## 2017-04-08 RX ADMIN — CLORAZEPATE DIPOTASSIUM 3.75 MG: 3.75 TABLET ORAL at 08:37

## 2017-04-08 RX ADMIN — FAMOTIDINE 20 MG: 20 TABLET ORAL at 08:37

## 2017-04-08 RX ADMIN — PAROXETINE HYDROCHLORIDE 20 MG: 20 TABLET, FILM COATED ORAL at 22:04

## 2017-04-08 RX ADMIN — AMLODIPINE BESYLATE 5 MG: 5 TABLET ORAL at 08:37

## 2017-04-08 RX ADMIN — POTASSIUM CHLORIDE 40 MEQ: 750 TABLET, FILM COATED, EXTENDED RELEASE ORAL at 08:37

## 2017-04-08 RX ADMIN — CETIRIZINE HYDROCHLORIDE 10 MG: 10 TABLET, FILM COATED ORAL at 08:37

## 2017-04-08 RX ADMIN — LORAZEPAM 0.5 MG: 0.5 TABLET ORAL at 06:59

## 2017-04-08 RX ADMIN — MONTELUKAST SODIUM 10 MG: 10 TABLET, FILM COATED ORAL at 08:38

## 2017-04-08 RX ADMIN — LORAZEPAM 0.5 MG: 0.5 TABLET ORAL at 22:04

## 2017-04-08 RX ADMIN — HYDRALAZINE HYDROCHLORIDE 20 MG: 20 INJECTION INTRAMUSCULAR; INTRAVENOUS at 04:32

## 2017-04-08 NOTE — ED NOTES
Patient voiding clear yellow urine in the bedpan, new gown given. Patient requesting low soduim meal, slow sodium TV dinner given, call bell in reach, will continue to monitor patient.

## 2017-04-08 NOTE — ROUTINE PROCESS
Bedside shift change report given to Micheline Blood RN (oncoming nurse) by Isrrael Winn RN (offgoing nurse). Report included the following information SBAR, Kardex, Intake/Output, MAR, Recent Results and Cardiac Rhythm NSR.      Harsha Lockhart RN  8:11 AM

## 2017-04-08 NOTE — BH NOTES
PSYCHIATRIC CONSULTATION                         IDENTIFICATION:    Patient Name  Reji Jimenez   Date of Birth 1955   [facility] [id number]   Medical Record Number  [id number]      Age  62 y.o. PCP MICHELLE Rodriguez   Admit date:  4/7/2017    Room Number  216/01  @ [facility]   Date of Service  4/8/2017            HISTORY         REASON FOR HOSPITALIZATION/CONSULTATION:  A psychiatric consultation was requested by Romana Lyons MD to evaluate or provide advice/opinion related to evaluating worsening anxiety  CC: Panic like sxs   HISTORY OF PRESENT ILLNESS:    The patient, Reji Jimenez, is a 62 y.o. WHITE OR  female with past psychiatric history significant for depression and anxietyno known past psychiatric history, who was admitted to the medical floor for the treatment of Hypertensive urgency. Patient reports/evidences the following emotional symptoms:  depression and anxiety. The above symptoms have been present for three days. These symptoms are of moderate severity. The symptoms are intermittent/ fleeting in nature. Additional symptomatology include anxiety, anxiety attacks, depression worse and problem with medication . The patient's condition has been precipitated by prescription meds changes. UDS: prescription benzos, BAL=0. Pt has been on Tranxene, Ativan and Paxil for many years for her sxs of anxiety and depression. Her Tranxene was recently discontinued and Ativan decreased by her new Psychiatrist. She experienced benzos withdrawal sxs and panic attack like sxs which subsequently have improved.        ALLERGIES:  Allergies   Allergen Reactions    Sulfa (Sulfonamide Antibiotics) Other (comments)     Body aches    Alcohol Other (comments)     Pt stated it would knock her on the floor, pt stated she tested positive     Tetracycline Other (comments)     vomiting      MEDICATIONS PRIOR TO ADMISSION:  Prescriptions Prior to Admission   Medication Sig    LORazepam (ATIVAN) 0.5 mg tablet Take 0.5 mg by mouth two (2) times a day.  pitavastatin (LIVALO) 2 mg tablet Take 2 mg by mouth nightly.  SOD CHLOR,BICARB/SQUEEZ BOTTLE (NEILMED SINUS RINSE COMPLETE NA) by Nasal route two (2) times a day.  acetaminophen (TYLENOL) 325 mg tablet Take 325 mg by mouth daily as needed for Pain.  raNITIdine hcl 150 mg capsule Take 150 mg by mouth nightly.  cetirizine (ZYRTEC) 10 mg tablet Take 10 mg by mouth daily.  therapeutic multivitamin (THERAGRAN) tablet Take 1 Tab by mouth daily.  PARoxetine (PAXIL) 20 mg tablet Take 20 mg by mouth nightly.  montelukast (SINGULAIR) 10 mg tablet Take 10 mg by mouth daily.  metoprolol succinate (TOPROL XL) 25 mg XL tablet Take 25 mg by mouth daily.  clorazepate (TRANXENE) 7.5 mg tablet Take 3.75 mg by mouth three (3) times daily.  mupirocin (BACTROBAN) 2 % ointment by Both Nostrils route two (2) times a day. PAST MEDICAL HISTORY:  Past Medical History:   Diagnosis Date    Arthritis     Asthma     Hypertension     Psychiatric disorder     anxiety and panic attacks     Past Surgical History:   Procedure Laterality Date    [place] INCISE/CRYOSURG LESN BLADDER        SOCIAL HISTORY: Per  note  Social History     Social History    Marital status:      Spouse name: N/A    Number of children: N/A    Years of education: N/A     Occupational History    Not on file. Social History Main Topics    Smoking status: Never Smoker    Smokeless tobacco: Not on file    Alcohol use No    Drug use: Not on file    Sexual activity: Not on file     Other Topics Concern    Not on file     Social History Narrative      FAMILY HISTORY: History reviewed. No pertinent family history. History reviewed. No pertinent family history. REVIEW of SYSTEMS:   Psychological ROS: positive for - anxiety and depression  Pertinent items are noted in the History of Present Illness. All other Systems reviewed and are considered negative. MENTAL STATUS EXAM & VITALS       MENTAL STATUS EXAM (MSE):    MSE FINDINGS ARE WITHIN NORMAL LIMITS (WNL) UNLESS OTHERWISE STATED BELOW. Orientation oriented to time, place and person   Vital Signs (BP,Pulse, Temp) See below (reviewed 4/8/2017); vital signs are WNL if not listed below.    Gait and Station Stable, no ataxia   Abnormal Muscular Movements/Tone/Behavior No EPS, no Tardive Dyskinesia, no abnormal muscular movements; wnl tone   Relations cooperative   General Appearance:  age appropriate and casually dressed   Language No aphasia or dysarthria   Speech:  normal pitch and normal volume   Thought Processes logical, wnl rate of thoughts, good abstract reasoning and computation   Thought Associations logical   Thought Content free of delusions and free of hallucinations   Suicidal Ideations none   Homicidal Ideations none   Mood:  anxious   Affect:  anxious   Memory recent  adequate   Memory remote:  adequate   Concentration/Attention:  adequate   Fund of Knowledge average   Insight:  fair   Reliability good   Judgment:  fair            VITALS:     Patient Vitals for the past 24 hrs:   Temp Pulse Resp BP SpO2   04/08/17 1532 99.7 °F (37.6 °C) 92 16 139/80 98 %   04/08/17 1159 98 °F (36.7 °C) 99 16 138/70 100 %   04/08/17 0731 98.3 °F (36.8 °C) 88 16 142/68 100 %   04/08/17 0419 98.5 °F (36.9 °C) 86 16 (!) 163/91 -   04/07/17 2314 98.8 °F (37.1 °C) (!) 102 18 166/74 100 %   04/07/17 2216 - 86 - 168/79 -   04/07/17 2115 - 76 17 (!) 197/101 -   04/07/17 2045 - 77 13 172/80 100 %   04/07/17 2024 98.1 °F (36.7 °C) 72 14 (!) 129/105 100 %   04/07/17 1930 - 71 10 (!) 199/93 100 %   04/07/17 1900 - 72 14 176/84 100 %   04/07/17 1859 - 73 - 178/88 -   04/07/17 1830 - 77 24 172/63 100 %              DATA     LABORATORY DATA:  Labs Reviewed   METABOLIC PANEL, BASIC - Abnormal; Notable for the following:        Result Value    Sodium 132 (*)     Potassium 3.3 (*)     BUN 4 (*)     BUN/Creatinine ratio 7 (*) All other components within normal limits   POC CHEM8 - Abnormal; Notable for the following:     Sodium (POC) 125 (*)     Chloride (POC) 87 (*)     Glucose (POC) 113 (*)     BUN (POC) 4 (*)     Creatinine (POC) 0.4 (*)     All other components within normal limits   URINALYSIS W/ RFLX MICROSCOPIC   SODIUM, UR, RANDOM   TROPONIN I   OSMOLALITY, SERUM/PLASMA   CBC W/O DIFF   URINALYSIS W/MICROSCOPIC   POC CHEM8     Admission on 04/07/2017   Component Date Value Ref Range Status    Ventricular Rate 04/07/2017 76  BPM Final    Atrial Rate 04/07/2017 76  BPM Final    P-R Interval 04/07/2017 140  ms Final    QRS Duration 04/07/2017 76  ms Final    Q-T Interval 04/07/2017 388  ms Final    QTC Calculation (Bezet) 04/07/2017 436  ms Final    Calculated P Axis 04/07/2017 29  degrees Final    Calculated R Axis 04/07/2017 53  degrees Final    Calculated T Axis 04/07/2017 69  degrees Final    Diagnosis 04/07/2017    Final                    Value:Normal sinus rhythm  Confirmed by Susu Gomez MD. ([id number]) on 4/8/2017 12:08:17 AM      Calcium, ionized (POC) 04/07/2017 1.18  1.12 - 1.32 MMOL/L Final    Sodium (POC) 04/07/2017 125* 136 - 145 MMOL/L Final    Potassium (POC) 04/07/2017 3.5  3.5 - 5.1 MMOL/L Final    Chloride (POC) 04/07/2017 87* 98 - 107 MMOL/L Final    CO2 (POC) 04/07/2017 28  21 - 32 MMOL/L Final    Anion gap (POC) 04/07/2017 15  5 - 15 mmol/L Final    Glucose (POC) 04/07/2017 113* 65 - 100 MG/DL Final    BUN (POC) 04/07/2017 4* 9 - 20 MG/DL Final    Creatinine (POC) 04/07/2017 0.4* 0.6 - 1.3 MG/DL Final    GFR-AA (POC) 04/07/2017 >60  >60 ml/min/1.73m2 Final    GFR, non-AA (POC) 04/07/2017 >60  >60 ml/min/1.73m2 Final    Hemoglobin (POC) 04/07/2017 15.0  11.5 - 16.0 GM/DL Final    Hematocrit (POC) 04/07/2017 44  35.0 - 47.0 % Final    Comment 04/07/2017 Comment Not Indicated.     Final    Color 04/07/2017 YELLOW/STRAW    Final    Appearance 04/07/2017 CLEAR  CLEAR   Final    Specific gravity 04/07/2017 1.007  1.003 - 1.030   Final    pH (UA) 04/07/2017 7.5  5.0 - 8.0   Final    Protein 04/07/2017 NEGATIVE   NEG mg/dL Final    Glucose 04/07/2017 NEGATIVE   NEG mg/dL Final    Ketone 04/07/2017 NEGATIVE   NEG mg/dL Final    Bilirubin 04/07/2017 NEGATIVE   NEG   Final    Blood 04/07/2017 NEGATIVE   NEG   Final    Urobilinogen 04/07/2017 0.2  0.2 - 1.0 EU/dL Final    Nitrites 04/07/2017 NEGATIVE   NEG   Final    Leukocyte Esterase 04/07/2017 NEGATIVE   NEG   Final    Sodium urine, random 04/07/2017 25  MMOL/L Final    Troponin-I, Qt. 04/07/2017 <0.04  <0.05 ng/mL Final    Osmolality, serum/plasma 04/07/2017 269  mOsm/kg H2O Final    Sodium 04/08/2017 132* 136 - 145 mmol/L Final    Potassium 04/08/2017 3.3* 3.5 - 5.1 mmol/L Final    Chloride 04/08/2017 98  97 - 108 mmol/L Final    CO2 04/08/2017 22  21 - 32 mmol/L Final    Anion gap 04/08/2017 12  5 - 15 mmol/L Final    Glucose 04/08/2017 91  65 - 100 mg/dL Final    BUN 04/08/2017 4* 6 - 20 MG/DL Final    Creatinine 04/08/2017 0.55  0.55 - 1.02 MG/DL Final    BUN/Creatinine ratio 04/08/2017 7* 12 - 20   Final    GFR est AA 04/08/2017 >60  >60 ml/min/1.73m2 Final    GFR est non-AA 04/08/2017 >60  >60 ml/min/1.73m2 Final    Calcium 04/08/2017 8.7  8.5 - 10.1 MG/DL Final    WBC 04/08/2017 9.5  3.6 - 11.0 K/uL Final    RBC 04/08/2017 5.04  3.80 - 5.20 M/uL Final    HGB 04/08/2017 14.6  11.5 - 16.0 g/dL Final    HCT 04/08/2017 43.2  35.0 - 47.0 % Final    MCV 04/08/2017 85.7  80.0 - 99.0 FL Final    MCH 04/08/2017 29.0  26.0 - 34.0 PG Final    MCHC 04/08/2017 33.8  30.0 - 36.5 g/dL Final    RDW 04/08/2017 13.0  11.5 - 14.5 % Final    PLATELET 73/03/9182 718  150 - 400 K/uL Final        RADIOLOGY REPORTS:    Results from Hospital Encounter encounter on 03/19/14   XR CHEST PA LAT   Narrative **Final Report**      ICD Codes / Adm. Diagnosis: 46   / Fatigue    Examination:  CR CHEST PA AND LATERAL  - [id number] - Mar 19 2014 12:07PM  Accession No:  [id number]  Reason:  cough      REPORT:  Exam:  2 view chest    Indication: Cough    Comparison to 5/7/2010. PA and lateral views demonstrate normal heart size. There is no acute   process in the lung fields. The osseous structures are unremarkable. IMPRESSION: No acute process or change compared to the prior exam.           Signing/Reading Doctor: Edison Delgado ([id number])    Approved: Edison Delgado ([id number])  Mar 19 2014 12:09PM                               Ct Head Wo Cont    Result Date: 4/7/2017  INDICATION: dizziness. Anxiety and panic attack. Exam: Noncontrast CT of the brain is performed with 5 mm collimation. CT dose reduction was achieved with the use of the standardized protocol tailored for this examination and automatic exposure control for dose modulation. Adaptive statistical iterative reconstruction (ASIR) was utilized. FINDINGS: There is no acute intracranial hemorrhage, mass, mass effect or herniation. Ventricular system is normal. The gray-white matter differentiation is well-preserved. The mastoid air cells are well pneumatized. The visualized paranasal sinuses are normal.     IMPRESSION: No acute intracranial hemorrhage, mass or infarct.                MEDICATIONS       ALL MEDICATIONS  Current Facility-Administered Medications   Medication Dose Route Frequency    0.9% sodium chloride infusion  75 mL/hr IntraVENous CONTINUOUS    LORazepam (ATIVAN) tablet 0.5 mg  0.5 mg Oral TID    PARoxetine (PAXIL) tablet 20 mg  20 mg Oral QHS    montelukast (SINGULAIR) tablet 10 mg  10 mg Oral DAILY    metoprolol succinate (TOPROL-XL) XL tablet 25 mg  25 mg Oral DAILY    clorazepate (TRANXENE) tablet 3.75 mg  3.75 mg Oral TID    atorvastatin (LIPITOR) tablet 20 mg  20 mg Oral QHS    sodium chloride (OCEAN) 0.65 % nasal spray 1 Spray  1 Spray Both Nostrils PRN    acetaminophen (TYLENOL) tablet 325 mg  325 mg Oral DAILY PRN    famotidine (PEPCID) tablet 20 mg  20 mg Oral DAILY    cetirizine (ZYRTEC) tablet 10 mg  10 mg Oral DAILY    therapeutic multivitamin (THERAGRAN) tablet 1 Tab  1 Tab Oral DAILY    sodium chloride (NS) flush 5-10 mL  5-10 mL IntraVENous Q8H    sodium chloride (NS) flush 5-10 mL  5-10 mL IntraVENous PRN    LORazepam (ATIVAN) tablet 0.5 mg  0.5 mg Oral BID PRN    enoxaparin (LOVENOX) injection 40 mg  40 mg SubCUTAneous Q24H    amLODIPine (NORVASC) tablet 5 mg  5 mg Oral DAILY    lisinopril (PRINIVIL, ZESTRIL) tablet 5 mg  5 mg Oral DAILY    sodium chloride (NS) flush 5-10 mL  5-10 mL IntraVENous Q8H    sodium chloride (NS) flush 5-10 mL  5-10 mL IntraVENous PRN      SCHEDULED MEDICATIONS  Current Facility-Administered Medications   Medication Dose Route Frequency    0.9% sodium chloride infusion  75 mL/hr IntraVENous CONTINUOUS    LORazepam (ATIVAN) tablet 0.5 mg  0.5 mg Oral TID    PARoxetine (PAXIL) tablet 20 mg  20 mg Oral QHS    montelukast (SINGULAIR) tablet 10 mg  10 mg Oral DAILY    metoprolol succinate (TOPROL-XL) XL tablet 25 mg  25 mg Oral DAILY    clorazepate (TRANXENE) tablet 3.75 mg  3.75 mg Oral TID    atorvastatin (LIPITOR) tablet 20 mg  20 mg Oral QHS    famotidine (PEPCID) tablet 20 mg  20 mg Oral DAILY    cetirizine (ZYRTEC) tablet 10 mg  10 mg Oral DAILY    therapeutic multivitamin (THERAGRAN) tablet 1 Tab  1 Tab Oral DAILY    sodium chloride (NS) flush 5-10 mL  5-10 mL IntraVENous Q8H    enoxaparin (LOVENOX) injection 40 mg  40 mg SubCUTAneous Q24H    amLODIPine (NORVASC) tablet 5 mg  5 mg Oral DAILY    lisinopril (PRINIVIL, ZESTRIL) tablet 5 mg  5 mg Oral DAILY    sodium chloride (NS) flush 5-10 mL  5-10 mL IntraVENous Q8H                ASSESSMENT & PLAN        The patient Chelo Canchola is a 62 y.o.  female who presents at this time for treatment of the following diagnoses:  Patient Active Hospital Problem List:   Hypertensive urgency (4/7/2017)    Assessment: Episodic increased BP    Plan:Per Medical    Anxiety associated with depression (4/8/2017)    Assessment: increased anxiety from Benzos withdrwals    Plan: Increase scheduled Lorazepam tp 0.5 mg tid. Continue Lorazepam-prn for breakthrough high anxiety sxs. Recommend switching from Paxil to Sertraline on outpatient basis and taper up Sertraline dosage as needed and as tolerated. Pt. CNP and RN were debriefed. Thank you very much for the opportunity to participate in the care of your patient. I will continue to follow up with patient as deemed appropriate. A coordinated, multidisplinary treatment team round was conducted with the patient; that includes the nurse, unit pharmcist,  and writer all present. The following regarding medications was addressed during rounds with patient:   the risks and benefits of the proposed medication. The patient was given the opportunity to ask questions. Informed consent given to the use of the above medications. I will continue to adjust psychiatric and non-psychiatric medications (see above \"medication\" section and orders section for details) as deemed appropriate & based upon diagnoses and response to treatment. I have reviewed admission (and previous/old) labs and medical tests in the EHR and or transferring hospital documents. I will continue to order blood tests/labs and diagnostic tests as deemed appropriate and review results as they become available (see orders for details). I have reviewed old psychiatric and medical records available in the EHR. I Will order additional psychiatric records from other institutions to further elucidate the nature of patient's psychopathology and review once available. I will gather additional collateral information from friends, family and o/p treatment team to further elucidate the nature of patient's psychopathology and baselline level of psychiatric functioning.                      SIGNED:    Joslyn Garnett MD  4/8/2017

## 2017-04-08 NOTE — PROGRESS NOTES
Hospitalist Progress Note  [place], [place]  Office: [phone]  Cell: [phone]    Date of Service:  2017  NAME:  Anderson Gosselin  :  1955  MRN:  [id number]    Admission Summary:   Pt presented to the ED with dizziness and elevated blood pressure. Pt felt that her main problem was anxiety secondary to adjustments to her medications made by her psychiatrist (was on ativan 1 mg bid: now 0.5 mg bid; and Tranxene 7.5 mg tid now 3.75 mg tid). Denied chest pain or SOB  but noted racing of heart occasionally. Interval history / Subjective:      Pt in bed eating dinner, says she is feeling a bit better now. Changes subject frequently, usually directed about her anxiety medication. Assessment & Plan:     Hypertensive Crisis (POA):  - on norvasc (5), lisinopril (5), metorolol succinate (25)  - BP much improved, last dose of hydralazine was at 0400 this am    Dizziness (POA): Likely due to above  - PT/OT evaluations have been requested    Chronic Anxiety:  - continue meds as prescribed, psychiatry has been consulted to discuss her regimen.   - Addendum 1430: psychiatry has seen and feels like pts benzos were titrated down too quickly. He feels that her ativan should be increased to 0.5 mg TID for now. He also recommends weaning off Paxil while starting and titrating up zoloft on an outpatient basis with her psychiatrist.    Hx Irritable Bladder: on detrol PTA    Mild Hypokalemia: Replaced     Hyponatremia: Continue with IVF    Code status: Full  DVT prophylaxis: lovenox  Care Plan discussed with: patient, nurse and attending MD  Disposition: Home when able, PT/OT eval.    May transfer to medical floor     Hospital Problems  Date Reviewed: 2017          Codes Class Noted POA    * (Principal)Hypertensive urgency ICD-10-CM: I16.0  ICD-9-CM: 401.9  2017 Yes            Review of Systems:   Denies HA. No chest pain or pressure.  No respiratory or GI complaints. Vital Signs:    Last 24hrs VS reviewed since prior progress note. Most recent are:  Visit Vitals    /68 (BP 1 Location: Left arm, BP Patient Position: At rest)    Pulse 88    Temp 98.3 °F (36.8 °C)    Resp 16    Ht 5' 2\" (1.575 m)    Wt 50.5 kg (111 lb 5.3 oz)    SpO2 100%    BMI 20.36 kg/m2       Intake/Output Summary (Last 24 hours) at 04/08/17 1039  Last data filed at 04/08/17 0505   Gross per 24 hour   Intake             1400 ml   Output              650 ml   Net              750 ml      Physical Examination:         Constitutional:  No acute distress, cooperative, pleasant    ENT:  Oral mucous moist.    Resp:  CTA bilaterally. No wheezing. No accessory muscle use. On RA   CV:  Regular rhythm. No murmurs. GI:  Soft, non distended, non tender. Normoactive bowel sounds    Musculoskeletal:  No edema, warm, 2+ pulses throughout    Neurologic:  Moves all extremities. AAOx3   Psych: Mild anxiety but controlled. Anxious about when she takes her meds. Data Review:   Review and/or order of clinical lab test  Review and/or order of tests in the radiology section of CPT  Review and/or order of tests in the medicine section of CPT    Labs:     Recent Labs      04/08/17   0423   WBC  9.5   HGB  14.6   HCT  43.2   PLT  332     Recent Labs      04/08/17   0423   NA  132*   K  3.3*   CL  98   CO2  22   BUN  4*   CREA  0.55   GLU  91   CA  8.7     No results for input(s): SGOT, GPT, ALT, AP, TBIL, TBILI, TP, ALB, GLOB, GGT, AML, LPSE in the last 72 hours. No lab exists for component: AMYP, HLPSE  No results for input(s): INR, PTP, APTT in the last 72 hours. No lab exists for component: INREXT   No results for input(s): FE, TIBC, PSAT, FERR in the last 72 hours. No results found for: FOL, RBCF   No results for input(s): PH, PCO2, PO2 in the last 72 hours.   Recent Labs      04/07/17   2226   TROIQ  <0.04     No results found for: CHOL, CHOLX, CHLST, CHOLV, HDL, LDL, DLDL, LDLC, DLDLP, TGL, TGLX, TRIGL, TRIGP, CHHD, [facility]  Lab Results   Component Value Date/Time    Glucose (POC) 113 04/07/2017 03:43 PM    Glucose (POC) 105 05/07/2010 11:44 AM     Lab Results   Component Value Date/Time    Color YELLOW/STRAW 04/07/2017 05:09 PM    Appearance CLEAR 04/07/2017 05:09 PM    Specific gravity 1.007 04/07/2017 05:09 PM    pH (UA) 7.5 04/07/2017 05:09 PM    Protein NEGATIVE  04/07/2017 05:09 PM    Glucose NEGATIVE  04/07/2017 05:09 PM    Ketone NEGATIVE  04/07/2017 05:09 PM    Bilirubin NEGATIVE  04/07/2017 05:09 PM    Urobilinogen 0.2 04/07/2017 05:09 PM    Nitrites NEGATIVE  04/07/2017 05:09 PM    Leukocyte Esterase NEGATIVE  04/07/2017 05:09 PM    Epithelial cells FEW 03/19/2014 11:25 AM    Bacteria NEGATIVE  03/19/2014 11:25 AM    WBC 0-4 03/19/2014 11:25 AM    RBC 0-5 03/19/2014 11:25 AM     Medications Reviewed:     Current Facility-Administered Medications   Medication Dose Route Frequency    hydrALAZINE (APRESOLINE) 20 mg/mL injection 20 mg  20 mg IntraVENous Q6H PRN    PARoxetine (PAXIL) tablet 20 mg  20 mg Oral QHS    montelukast (SINGULAIR) tablet 10 mg  10 mg Oral DAILY    metoprolol succinate (TOPROL-XL) XL tablet 25 mg  25 mg Oral DAILY    clorazepate (TRANXENE) tablet 3.75 mg  3.75 mg Oral TID    LORazepam (ATIVAN) tablet 0.5 mg  0.5 mg Oral BID    atorvastatin (LIPITOR) tablet 20 mg  20 mg Oral QHS    sodium chloride (OCEAN) 0.65 % nasal spray 1 Spray  1 Spray Both Nostrils PRN    acetaminophen (TYLENOL) tablet 325 mg  325 mg Oral DAILY PRN    famotidine (PEPCID) tablet 20 mg  20 mg Oral DAILY    cetirizine (ZYRTEC) tablet 10 mg  10 mg Oral DAILY    therapeutic multivitamin (THERAGRAN) tablet 1 Tab  1 Tab Oral DAILY    0.9% sodium chloride infusion 1,000 mL  1,000 mL IntraVENous CONTINUOUS    sodium chloride (NS) flush 5-10 mL  5-10 mL IntraVENous Q8H    sodium chloride (NS) flush 5-10 mL  5-10 mL IntraVENous PRN    LORazepam (ATIVAN) tablet 0.5 mg  0.5 mg Oral BID PRN    enoxaparin (LOVENOX) injection 40 mg  40 mg SubCUTAneous Q24H    amLODIPine (NORVASC) tablet 5 mg  5 mg Oral DAILY    lisinopril (PRINIVIL, ZESTRIL) tablet 5 mg  5 mg Oral DAILY    sodium chloride (NS) flush 5-10 mL  5-10 mL IntraVENous Q8H    sodium chloride (NS) flush 5-10 mL  5-10 mL IntraVENous PRN   ______________________________________________________________________  EXPECTED LENGTH OF STAY: - - -  ACTUAL LENGTH OF STAY:          1               Callie Coy NP

## 2017-04-08 NOTE — ROUTINE PROCESS
Primary Nurse Buck Arboleda RN and DILAN Larsen performed a dual skin assessment on this patient Impairment noted- see wound doc flow sheet  Cliff score is 22      Buck Arboleda RN  9:36 PM

## 2017-04-08 NOTE — ROUTINE PROCESS
TRANSFER - OUT REPORT:    Verbal report given to [facility] (name) on Anderson Gosselin  being transferred to [facility](unit) for routine progression of care       Report consisted of patients Situation, Background, Assessment and   Recommendations(SBAR). Information from the following report(s) SBAR, ED Summary, [facility] and Recent Results was reviewed with the receiving nurse. Lines:   Peripheral IV 04/07/17 Left Antecubital (Active)   Site Assessment Clean, dry, & intact 4/7/2017  3:38 PM   Phlebitis Assessment 0 4/7/2017  3:38 PM   Infiltration Assessment 0 4/7/2017  3:38 PM   Dressing Status Clean, dry, & intact 4/7/2017  3:38 PM   Dressing Type Tape;Transparent 4/7/2017  3:38 PM   Hub Color/Line Status Pink;Capped;Flushed;Patent 4/7/2017  3:38 PM   Action Taken Blood drawn 4/7/2017  3:38 PM        Opportunity for questions and clarification was provided.       Patient transported with:   [employer]

## 2017-04-08 NOTE — PROGRESS NOTES
Patient cleared by nursing. Psychiatry came into room once history was completed. Therefore, remainder of evaluation was not able to be completed. History obtained:  Patient lives in a private residence with 5 steps to enter, able to reach both hand rails. Home is one level. She owns and uses a rollator and a cane; using some sort of assistive device full time. Cane from home in room. Lives with  who works during the day, who also uses a cane and had a recent heart attack and unable to assist.  Sleeps in a regular bed (no hospital bed or recliner - but sometimes sleeps on the sofa bed when her 's neurofibromatosis flares up). Patient reports significant hesitation with getting out of bed before speaking with a doctor. She also reports that she is very weak and does not want to go anywhere until her strength returns. Nursing was notified that PT had to abort evaluation. Discussed history, he reports history they obtained was that she lived alone. He also notes that she will be transferring to another floor later today.       Juma Helm, PT

## 2017-04-08 NOTE — PROGRESS NOTES
TRANSFER - IN REPORT:    Verbal report received from Via Peng Suarez, RN(name) on Loulou Corcoran  being received from [facility](unit) for routine progression of care      Report consisted of patients Situation, Background, Assessment and   Recommendations(SBAR). Information from the following report(s) SBAR, Kardex, Intake/Output, MAR and Recent Results was reviewed with the receiving nurse. Opportunity for questions and clarification was provided. Assessment completed upon patients arrival to unit and care assumed.

## 2017-04-08 NOTE — PROGRESS NOTES
04/08/17 0300   Wound Knee Anterior;Right;Left   Date First Assessed/Time First Assessed: 04/07/17 4230   Wound Type: Abrasion  Location: Knee  Orientation: Anterior;Right;Left   DRESSING STATUS Clean; Intact; Clean, dry, and intact   Incision site well approximated? No     Patient has redness and slight abrasions to bilateral anterior surface of her knees. Patient states this is from some recent falls at home.      Shannen Lockwood RN   3:02 AM

## 2017-04-09 PROBLEM — I16.0 HYPERTENSIVE URGENCY: Status: RESOLVED | Noted: 2017-04-07 | Resolved: 2017-04-09

## 2017-04-09 LAB
ANION GAP BLD CALC-SCNC: 8 MMOL/L (ref 5–15)
BUN SERPL-MCNC: 4 MG/DL (ref 6–20)
BUN/CREAT SERPL: 9 (ref 12–20)
CALCIUM SERPL-MCNC: 8.9 MG/DL (ref 8.5–10.1)
CHLORIDE SERPL-SCNC: 97 MMOL/L (ref 97–108)
CO2 SERPL-SCNC: 27 MMOL/L (ref 21–32)
CREAT SERPL-MCNC: 0.44 MG/DL (ref 0.55–1.02)
ERYTHROCYTE [DISTWIDTH] IN BLOOD BY AUTOMATED COUNT: 13.3 % (ref 11.5–14.5)
GLUCOSE SERPL-MCNC: 91 MG/DL (ref 65–100)
HCT VFR BLD AUTO: 42.5 % (ref 35–47)
HGB BLD-MCNC: 14.4 G/DL (ref 11.5–16)
MCH RBC QN AUTO: 29.2 PG (ref 26–34)
MCHC RBC AUTO-ENTMCNC: 33.9 G/DL (ref 30–36.5)
MCV RBC AUTO: 86.2 FL (ref 80–99)
PLATELET # BLD AUTO: 324 K/UL (ref 150–400)
POTASSIUM SERPL-SCNC: 3.9 MMOL/L (ref 3.5–5.1)
RBC # BLD AUTO: 4.93 M/UL (ref 3.8–5.2)
SODIUM SERPL-SCNC: 132 MMOL/L (ref 136–145)
WBC # BLD AUTO: 8.2 K/UL (ref 3.6–11)

## 2017-04-09 PROCEDURE — 74011250636 HC RX REV CODE- 250/636: Performed by: INTERNAL MEDICINE

## 2017-04-09 PROCEDURE — 36415 COLL VENOUS BLD VENIPUNCTURE: CPT | Performed by: INTERNAL MEDICINE

## 2017-04-09 PROCEDURE — 97161 PT EVAL LOW COMPLEX 20 MIN: CPT

## 2017-04-09 PROCEDURE — G8979 MOBILITY GOAL STATUS: HCPCS

## 2017-04-09 PROCEDURE — 74011250637 HC RX REV CODE- 250/637: Performed by: INTERNAL MEDICINE

## 2017-04-09 PROCEDURE — 74011250637 HC RX REV CODE- 250/637: Performed by: NURSE PRACTITIONER

## 2017-04-09 PROCEDURE — 65270000029 HC RM PRIVATE

## 2017-04-09 PROCEDURE — 85027 COMPLETE CBC AUTOMATED: CPT | Performed by: INTERNAL MEDICINE

## 2017-04-09 PROCEDURE — 97530 THERAPEUTIC ACTIVITIES: CPT

## 2017-04-09 PROCEDURE — G8978 MOBILITY CURRENT STATUS: HCPCS

## 2017-04-09 PROCEDURE — 80048 BASIC METABOLIC PNL TOTAL CA: CPT | Performed by: INTERNAL MEDICINE

## 2017-04-09 RX ORDER — LORAZEPAM 0.5 MG/1
0.5 TABLET ORAL 3 TIMES DAILY
Qty: 90 TAB | Refills: 0 | Status: SHIPPED
Start: 2017-04-09

## 2017-04-09 RX ORDER — LISINOPRIL 5 MG/1
5 TABLET ORAL DAILY
Qty: 30 TAB | Refills: 0 | Status: SHIPPED | OUTPATIENT
Start: 2017-04-09 | End: 2017-04-10

## 2017-04-09 RX ORDER — AMLODIPINE BESYLATE 5 MG/1
5 TABLET ORAL DAILY
Qty: 30 TAB | Refills: 0 | Status: SHIPPED | OUTPATIENT
Start: 2017-04-09 | End: 2017-04-10

## 2017-04-09 RX ORDER — LORAZEPAM 0.5 MG/1
0.5 TABLET ORAL
Qty: 30 TAB | Refills: 0 | Status: SHIPPED
Start: 2017-04-09

## 2017-04-09 RX ADMIN — CETIRIZINE HYDROCHLORIDE 10 MG: 10 TABLET, FILM COATED ORAL at 08:24

## 2017-04-09 RX ADMIN — LORAZEPAM 0.5 MG: 0.5 TABLET ORAL at 16:39

## 2017-04-09 RX ADMIN — Medication 10 ML: at 21:41

## 2017-04-09 RX ADMIN — Medication 10 ML: at 13:18

## 2017-04-09 RX ADMIN — LORAZEPAM 0.5 MG: 0.5 TABLET ORAL at 21:40

## 2017-04-09 RX ADMIN — CLORAZEPATE DIPOTASSIUM 3.75 MG: 3.75 TABLET ORAL at 21:40

## 2017-04-09 RX ADMIN — LORAZEPAM 0.5 MG: 0.5 TABLET ORAL at 08:24

## 2017-04-09 RX ADMIN — AMLODIPINE BESYLATE 5 MG: 5 TABLET ORAL at 08:24

## 2017-04-09 RX ADMIN — LORAZEPAM 0.5 MG: 0.5 TABLET ORAL at 13:18

## 2017-04-09 RX ADMIN — CLORAZEPATE DIPOTASSIUM 3.75 MG: 3.75 TABLET ORAL at 16:39

## 2017-04-09 RX ADMIN — MONTELUKAST SODIUM 10 MG: 10 TABLET, FILM COATED ORAL at 08:24

## 2017-04-09 RX ADMIN — ATORVASTATIN CALCIUM 20 MG: 20 TABLET, FILM COATED ORAL at 21:40

## 2017-04-09 RX ADMIN — LISINOPRIL 5 MG: 5 TABLET ORAL at 08:25

## 2017-04-09 RX ADMIN — ENOXAPARIN SODIUM 40 MG: 40 INJECTION SUBCUTANEOUS at 21:40

## 2017-04-09 RX ADMIN — PAROXETINE HYDROCHLORIDE 20 MG: 20 TABLET, FILM COATED ORAL at 21:40

## 2017-04-09 RX ADMIN — METOPROLOL SUCCINATE 25 MG: 25 TABLET, EXTENDED RELEASE ORAL at 08:24

## 2017-04-09 RX ADMIN — CLORAZEPATE DIPOTASSIUM 3.75 MG: 3.75 TABLET ORAL at 08:24

## 2017-04-09 RX ADMIN — THERA TABS 1 TABLET: TAB at 08:25

## 2017-04-09 RX ADMIN — FAMOTIDINE 20 MG: 20 TABLET ORAL at 08:24

## 2017-04-09 NOTE — PROGRESS NOTES
Bedside shift change report given to May Black RN (oncoming nurse) by Phuong Mohan RN (offgoing nurse). Report included the following information SBAR, Kardex and MAR.

## 2017-04-09 NOTE — PROGRESS NOTES
CM received consult for home health PT. CM met with patient who presented with marked anxiety. After some discussion, patient deferred discussion until 's arrival.  CM subsequently met with patient and  Adrien Aleman at the bedside.  presented multiple concerns regarding his and wife's safety should she be discharged.  said that the present arrangement with a home health aide twice per week plus addition of home health PT would be insufficient at this time.  CM advised that they need to address their concerns with the hospitalist. CM gave update to charge nurse who will arrange for contacting the hospitalist.

## 2017-04-09 NOTE — DISCHARGE SUMMARY
Discharge Summary     PATIENT ID: Lory Richardson  MRN: [id number]   YOB: 1955    DATE OF ADMISSION: 4/7/2017  2:28 PM    DATE OF DISCHARGE: 4/9/2017  PRIMARY CARE PROVIDER: MICHELLE Zuniga   ATTENDING PHYSICIAN: Ramona Villarreal MD  DISCHARGING PROVIDER: Kory Ricks, SANGEETHA. To contact this individual call [phone] and ask the  to page. If unavailable ask to be transferred the Adult Hospitalist Department. CONSULTATIONS: IP CONSULT TO HOSPITALIST  IP CONSULT TO PSYCHIATRY    ADMITTING [facility] COURSE:   Pt presented to the ED with dizziness and elevated blood pressure. Pt felt that her main problem was anxiety secondary to adjustments to her medications made by her psychiatrist (was on ativan 1 mg bid: now 0.5 mg bid; and Tranxene 7.5 mg tid now 3.75 mg tid). Denied chest pain or SOB  but noted racing of heart occasionally. DISCHARGE DIAGNOSES / PLAN:      Hypertensive Crisis (POA): Resolved  - on norvasc (5), lisinopril (5), metoprolol succinate (25)     Dizziness (POA): Likely due to above  - PT has seen, recommending HH.  consulted.     Chronic Anxiety:  - continue meds as prescribed, psychiatry has been consulted to discuss her regimen. - psychiatry has seen and feels like pts benzos were titrated down too quickly. He feels that her ativan should be increased to 0.5 mg TID for now.  He also recommends weaning off Paxil while starting and titrating up zoloft on an outpatient basis with her psychiatrist.     Hx Irritable Bladder: on detrol PTA     Mild Hypokalemia: Resolved      Hyponatremia: Appears chronic and is stable     FOLLOW UP APPOINTMENTS:    Follow-up Information     Follow up With Details Comments [address], [place] In 2 weeks  [facility]  [phone]      [place] In 1 week           ADDITIONAL CARE RECOMMENDATIONS:  Check Blood Pressure daily, keep record for PCP, you were started on two BP medications  Take anti-anxiety medication as directed and follow up with your psychiatrist next week    Inpatient Psych recommends switching from Paxil to Sertraline on outpatient basis and taper up Sertraline dosage as needed and as tolerated. DIET: Cardiac Diet    ACTIVITY: Activity as tolerated    EQUIPMENT needed: none new  DISCHARGE MEDICATIONS:  Current Discharge Medication List      START taking these medications    Details   amLODIPine (NORVASC) 5 mg tablet Take 1 Tab by mouth daily. Qty: 30 Tab, Refills: 0      lisinopril (PRINIVIL, ZESTRIL) 5 mg tablet Take 1 Tab by mouth daily. Qty: 30 Tab, Refills: 0         CONTINUE these medications which have CHANGED    Details   !! LORazepam (ATIVAN) 0.5 mg tablet Take 1 Tab by mouth three (3) times daily. Max Daily Amount: 1.5 mg.  Qty: 90 Tab, Refills: 0      !! LORazepam (ATIVAN) 0.5 mg tablet Take 1 Tab by mouth two (2) times daily as needed. Max Daily Amount: 1 mg. Qty: 30 Tab, Refills: 0       !! - Potential duplicate medications found. Please discuss with provider. CONTINUE these medications which have NOT CHANGED    Details   pitavastatin (LIVALO) 2 mg tablet Take 2 mg by mouth nightly. SOD CHLOR,BICARB/SQUEEZ BOTTLE (NEILMED SINUS RINSE COMPLETE NA) by Nasal route two (2) times a day. acetaminophen (TYLENOL) 325 mg tablet Take 325 mg by mouth daily as needed for Pain. raNITIdine hcl 150 mg capsule Take 150 mg by mouth nightly. cetirizine (ZYRTEC) 10 mg tablet Take 10 mg by mouth daily. therapeutic multivitamin (THERAGRAN) tablet Take 1 Tab by mouth daily. PARoxetine (PAXIL) 20 mg tablet Take 20 mg by mouth nightly. montelukast (SINGULAIR) 10 mg tablet Take 10 mg by mouth daily. metoprolol succinate (TOPROL XL) 25 mg XL tablet Take 25 mg by mouth daily. clorazepate (TRANXENE) 7.5 mg tablet Take 3.75 mg by mouth three (3) times daily.          STOP taking these medications       mupirocin (BACTROBAN) 2 % ointment Comments:   Reason for Stopping:             NOTIFY YOUR PHYSICIAN FOR ANY OF THE FOLLOWING:   Fever over 101 degrees for 24 hours. Chest pain, shortness of breath, fever, chills, nausea, vomiting, diarrhea, change in mentation, falling, weakness, bleeding. Severe pain or pain not relieved by medications. Or, any other signs or symptoms that you may have questions about. DISPOSITION:    Home With:   OT  PT  HH  RN       Long term SNF/Inpatient Rehab    Independent/assisted living    Hospice   xx Other: Home     PATIENT CONDITION AT DISCHARGE:   Functional status    Poor     Deconditioned    xx Independent      Cognition   xx  Lucid     Forgetful     Dementia      Catheters/lines (plus indication)    [place]     PICC     PEG    xx None      Code status   xx  Full code     DNR      PHYSICAL EXAMINATION AT DISCHARGE:  Visit Vitals    /65 (BP 1 Location: Left arm, BP Patient Position: At rest)    Pulse 86    Temp 98 °F (36.7 °C)    Resp 18    Ht 5' 2\" (1.575 m)    Wt 50.5 kg (111 lb 5.3 oz)    SpO2 100%    BMI 20.36 kg/m2     Pt seen this am, discussed possible discharge for today pending PT eval - had questions about her medications and was anxious about her not being able to walking. Emphasized that PT would see her prior to her discharge and if they felt she needed rehab or home health, they would let us know. 1345: Pt has seen pt and attending visited with her as well. Will plan on D/C today with HH. Constitutional: No acute distress, cooperative, pleasant    ENT: Oral mucous moist   Resp: CTA bilaterally. No wheezing. No accessory muscle use. On RA   CV: Regular rhythm. No murmurs. GI: Soft, non distended, non tender. Normoactive bowel sounds   Musculoskeletal: No edema, warm, 2+ pulses throughout   Neurologic: Moves all extremities.  AAOx3   Psych: Mild anxiety - today, about walking and medications     CHRONIC MEDICAL DIAGNOSES:  Problem List as of 4/9/2017  Date Reviewed: 4/9/2017          Codes Class Noted - Resolved    Anxiety associated with depression ICD-10-CM: F41.8  ICD-9-CM: 300.4  4/8/2017 - Present        * (Principal)RESOLVED: Hypertensive urgency ICD-10-CM: I16.0  ICD-9-CM: 401.9  4/7/2017 - 4/9/2017            Greater than 30 minutes were spent with the patient on counseling and coordination of care    Signed:   Timmy Katz NP  4/9/2017  7:09 AM

## 2017-04-09 NOTE — PROGRESS NOTES
CM called Collin Buitrago RN back to see what the determination of physician is. He is going to defer discharge until tomorrow. Due to she and her husbands safety concerns and patients high level of anxiety. CM will decide with physician tomorrow if she will go home with HH/PT or go to possibly [address].   Chelsea Maya RN CRM

## 2017-04-09 NOTE — PROGRESS NOTES
Bedside and Verbal shift change report given to Yordy Guidry RN (oncoming nurse) by Raegan Ojeda RN (offgoing nurse). Report included the following information SBAR, Kardex, Intake/Output, Recent Results and Med Rec Status.

## 2017-04-09 NOTE — PROGRESS NOTES
Problem: Mobility Impaired (Adult and Pediatric)  Goal: *Acute Goals and Plan of Care (Insert Text)  Physical Therapy Goals  Initiated 4/9/2017  1. Patient will move from supine to sit and sit to supine in bed with independence within 7 day(s). 2. Patient will transfer from bed to chair and chair to bed with modified independence using the least restrictive device within 7 day(s). 3. Patient will perform sit to stand with modified independence within 7 day(s). 4. Patient will ambulate with modified independence for 150 feet with the least restrictive device within 7 day(s). 5. Patient will ascend/descend 5 stairs with use of handrail(s) with modified independence within 7 day(s). PHYSICAL THERAPY EVALUATION  Patient: Anderson Gosselin (74 y.o. female)  Date: 4/9/2017  Primary Diagnosis: Hypertensive urgency        Precautions:  Fall      ASSESSMENT :  Based on the objective data described below, the patient presents with severe anxiety which seems to be her major limiting factor. Pt was in the chair and was able to stand to the RW without assistance. Once standing she was unable to take any steps due to her anxiety and fear of falling. Pt with good standing balance and was able to  place to march. She would not take any steps forward despite cues and encouragement. Pt sat down and stood x 3 times, but would not take steps. Physically she seems to be at her baseline, but her psych disorder is the main factor limiting her progressing. Pt feels her medications are off and requesting ativan. RN notified of session. PT will continue to follow 3x/week to further assess ambulation as appropriate. Patient will benefit from skilled intervention to address the above impairments.   Patients rehabilitation potential is considered to be Fair  Factors which may influence rehabilitation potential include:   [ ]         None noted  [ ]         Mental ability/status  [ ]         Medical condition  [ ] Home/family situation and support systems  [ ]         Safety awareness  [ ]         Pain tolerance/management  [X]         Other: Psych Disorder       PLAN :  Recommendations and Planned Interventions:  [X]           Bed Mobility Training             [ ]    Neuromuscular Re-Education  [X]           Transfer Training                   [ ]    Orthotic/Prosthetic Training  [X]           Gait Training                         [ ]    Modalities  [X]           Therapeutic Exercises           [ ]    Edema Management/Control  [X]           Therapeutic Activities            [X]    Patient and Family Training/Education  [ ]           Other (comment):     Frequency/Duration: Patient will be followed by physical therapy  3 times a week to address goals. Discharge Recommendations: Home Health vs None  Further Equipment Recommendations for Discharge: tbd       SUBJECTIVE:   Patient stated Do you think I need to go upstairs to the psych floor? [facility]      OBJECTIVE DATA SUMMARY:   HISTORY:    Past Medical History:   Diagnosis Date    Arthritis      Asthma      Hypertension      Psychiatric disorder       anxiety and panic attacks     Past Surgical History:   Procedure Laterality Date    CT INCISE/CRYOSURG LESN BLADDER         Prior Level of Function/Home Situation: mod indep with SPC vs rollator  Personal factors and/or comorbidities impacting plan of care:      Home Situation  Home Environment: Private residence  # Steps to Enter: 5  Rails to Enter: Yes  Hand Rails : Bilateral  One/Two Story Residence: One story  Living Alone: No  Support Systems: Spouse/Significant Other/Partner  Patient Expects to be Discharged to[de-identified] Private residence  Current DME Used/Available at Home: Caridad Valdes, straight, Vernadine Peaks, rollator     EXAMINATION/PRESENTATION/DECISION MAKING:   Critical Behavior:  Neurologic State: Alert  Orientation Level: Oriented X4  Cognition: Appropriate for age attention/concentration, Follows commands  Safety/Judgement: Awareness of environment  Hearing: Auditory  Auditory Impairment: None  Skin:  Appears intact     Range Of Motion:  AROM: Within functional limits                       Strength:    Strength: Within functional limits                    Tone & Sensation:   Tone: Normal              Sensation: Intact               Coordination:  Coordination: Within functional limits  Vision:      Functional Mobility:  Bed Mobility:              Transfers:  Sit to Stand: Modified independent  Stand to Sit: Modified independent                       Balance:   Sitting: Intact  Standing: Intact  Ambulation/Gait Training:   Pt unable to progress to ambulating                       Therapeutic Exercises:   Educated on AP's, LAQ's and seated marching     Functional Measure:  Tinetti test:      Sitting Balance: 1  Arises: 1  Attempts to Rise: 2  Immediate Standing Balance: 1  Standing Balance: 1  Nudged: 1  Eyes Closed: 1  Turn 360 Degrees - Continuous/Discontinuous: 0  Turn 360 Degrees - Steady/Unsteady: 0  Sitting Down: 1  Balance Score: 9  Indication of Gait: 0  R Step Length/Height: 0  L Step Length/Height: 0  R Foot Clearance: 0  L Foot Clearance: 0  Step Symmetry: 0  Step Continuity: 0  Path: 0  Trunk: 0  Walking Time: 0  Gait Score: 0  Total Score: 9         Tinetti Test and G-code impairment scale:  Percentage of Impairment CH     0%    CI     1-19% CJ     20-39% CK     40-59% CL     60-79% CM     80-99% CN      100%   Tinetti  Score 0-28 28 23-27 17-22 12-16 6-11 1-5 0          Tinetti Tool Score Risk of Falls  <19 = High Fall Risk  19-24 = Moderate Fall Risk  25-28 = Low Fall Risk  Tinetti ME. Performance-Oriented Assessment of Mobility Problems in Elderly Patients. [facility] 66; [id number].  (Scoring Description: PT Bulletin Feb. 10, 1993)     Older adults: Audra Hale et al, 2009; n = [address] elderly evaluated with ABC, COMPA, ADL, and IADL)  · Mean COMPA score for males aged 69-68 years = 26.21(3.40)  · Mean COMPA score for females age 69-68 years = 25.16(4.30)  · Mean COMPA score for males over 80 years = 23.29(6.02)  · Mean COMPA score for females over 80 years = 17.20(8.32)         G codes: In compliance with CMSs Claims Based Outcome Reporting, the following G-code set was chosen for this patient based on their primary functional limitation being treated: The outcome measure chosen to determine the severity of the functional limitation was the Tinetti with a score of 9/28 which was correlated with the impairment scale. · Mobility - Walking and Moving Around:               - CURRENT STATUS:    CL - 60%-79% impaired, limited or restricted               - GOAL STATUS:           CK - 40%-59% impaired, limited or restricted               - D/C STATUS:                       ---------------To be determined---------------      Physical Therapy Evaluation Charge Determination   History Examination Presentation Decision-Making   HIGH Complexity :3+ comorbidities / personal factors will impact the outcome/ POC  LOW Complexity : 1-2 Standardized tests and measures addressing body structure, function, activity limitation and / or participation in recreation  LOW Complexity : Stable, uncomplicated  Other outcome measures Tinetti  MEDIUM      Based on the above components, the patient evaluation is determined to be of the following complexity level: LOW      Pain:  Pain Scale 1: Numeric (0 - 10)  Pain Intensity 1: 0              Activity Tolerance:   No apparent distress  Please refer to the flowsheet for vital signs taken during this treatment.   After treatment:   [X]         Patient left in no apparent distress sitting up in chair  [ ]         Patient left in no apparent distress in bed  [X]         Call bell left within reach  [X]         Nursing notified  [ ]         Caregiver present  [ ]         Bed alarm activated      COMMUNICATION/EDUCATION:   The patients plan of care was discussed with: Registered Nurse.  [X]         Fall prevention education was provided and the patient/caregiver indicated understanding. [X]         Patient/family have participated as able in goal setting and plan of care. [X]         Patient/family agree to work toward stated goals and plan of care. [ ]         Patient understands intent and goals of therapy, but is neutral about his/her participation. [ ]         Patient is unable to participate in goal setting and plan of care.      Thank you for this referral.  Bakari Ray, PT   Time Calculation: 18 mins

## 2017-04-09 NOTE — PROGRESS NOTES
Brief:    Pt seems to have passed PT for out-pt PT or no PT but on reading PT's note       · pt did not ambulate and got a gait score of \"0\" . :\"   · \"Ambulation/Gait Training: Pt unable to progress to ambulating\" ( per PT)        Pt feel she needs rehab. Pt refuses to leave based on her belief. The plan is to therefore re-eval in am and if cont to not qualify for rehab, then will need a denial letter or equivalent. For now best to leave pt in hospital at least until am 4/10 on bases of her refusal to leave and the non ambulation as of this time        ROS:    No cp, no sob. No n/v/d/    Exam:   Visit Vitals    /80 (BP 1 Location: Left arm, BP Patient Position: At rest)    Pulse 85    Temp 97.9 °F (36.6 °C)    Resp 18    Ht 5' 2\" (1.575 m)    Wt 50.5 kg (111 lb 5.3 oz)    SpO2 99%    BMI 20.36 kg/m2           Constitutional: No acute distress, cooperative, pleasant    ENT: Oral mucous moist   Resp: CTA bilaterally. No wheezing. No accessory muscle use. On RA   CV: Regular rhythm. No murmurs. GI: Soft, non distended, non tender. Normoactive bowel sounds   Musculoskeletal: No edema, warm, 2+ pulses throughout   Neurologic: Moves all extremities. AAOx3   Psych: Mild anxiety - today, about walking and medications    PT n        Ass/Plan    Poor ambulation for re-eval in am;  see above. Hypertensive Crisis (POA): Resolved  - on norvasc (5), lisinopril (5), metoprolol succinate (25)      Dizziness (POA): Likely due to above vs benzo w/d  - PT has seen, recommending HH.  consulted.      Chronic Anxiety:  - continue meds as prescribed, psychiatry has been consulted to discuss her regimen. - psychiatry has seen and feels like pts benzos were titrated down too quickly. He feels that her ativan should be increased to 0.5 mg TID for now.  He also recommends weaning off Paxil while starting and titrating up zoloft on an outpatient basis with her psychiatrist.    Benzo withdrawal: seems better 4/9/2017       Hx Irritable Bladder: on detrol PTA      Mild Hypokalemia: Resolved       Hyponatremia: Appears chronic and is stable    Shemar Malone MD 4/9/2017

## 2017-04-09 NOTE — DISCHARGE INSTRUCTIONS
ADDITIONAL CARE RECOMMENDATIONS:   1. Take medications as prescribed. 2. Keep appointments as recommended/scheduled. 3. Please talk to your psychiatrist about adjusting your medications. The psychiatrist in the hospital recommended weaning off Paxil while starting and titrating up Zoloft on an outpatient basis. 4. Your psychiatric medications may be contributing to your low sodium levels due to dry mouth causing increased drinking of water. Please talk to your psychiatrist about adjusting your medications if needed. 5. Please check a basic metabolic panel (BMP) in 1 week. DIET: Low fat, Low cholesterol; please restrict water intake to less than 1500 ml per day    ACTIVITY: PT/OT Eval and Treat    WOUND CARE: none    EQUIPMENT needed: as per PT/OT     Anxiety Disorder: Care Instructions  Your Care Instructions  Anxiety is a normal reaction to stress. Difficult situations can cause you to have symptoms such as sweaty palms and a nervous feeling. In an anxiety disorder, the symptoms are far more severe. Constant worry, muscle tension, trouble sleeping, nausea and diarrhea, and other symptoms can make normal daily activities difficult or impossible. These symptoms may occur for no reason, and they can affect your work, school, or social life. Medicines, counseling, and self-care can all help. Follow-up care is a key part of your treatment and safety. Be sure to make and go to all appointments, and call your doctor if you are having problems. It's also a good idea to know your test results and keep a list of the medicines you take. How can you care for yourself at home? · Take medicines exactly as directed. Call your doctor if you think you are having a problem with your medicine. · Go to your counseling sessions and follow-up appointments. · Recognize and accept your anxiety. Then, when you are in a situation that makes you anxious, say to yourself, \"This is not an emergency.  I feel uncomfortable, but I am not in danger. I can keep going even if I feel anxious. \"  · Be kind to your body:  ¨ Relieve tension with exercise or a massage. ¨ Get enough rest.  ¨ Avoid alcohol, caffeine, nicotine, and illegal drugs. They can increase your anxiety level and cause sleep problems. ¨ Learn and do relaxation techniques. See below for more about these techniques. · Engage your mind. Get out and do something you enjoy. Go to a funny movie, or take a walk or hike. Plan your day. Having too much or too little to do can make you anxious. · Keep a record of your symptoms. Discuss your fears with a good friend or family member, or join a support group for people with similar problems. Talking to others sometimes relieves stress. · Get involved in social groups, or volunteer to help others. Being alone sometimes makes things seem worse than they are. · Get at least 30 minutes of exercise on most days of the week to relieve stress. Walking is a good choice. You also may want to do other activities, such as running, swimming, cycling, or playing tennis or team sports. Relaxation techniques  Do relaxation exercises 10 to 20 minutes a day. You can play soothing, relaxing music while you do them, if you wish. · Tell others in your house that you are going to do your relaxation exercises. Ask them not to disturb you. · Find a comfortable place, away from all distractions and noise. · Lie down on your back, or sit with your back straight. · Focus on your breathing. Make it slow and steady. · Breathe in through your nose. Breathe out through either your nose or mouth. · Breathe deeply, filling up the area between your navel and your rib cage. Breathe so that your belly goes up and down. · Do not hold your breath. · Breathe like this for 5 to 10 minutes. Notice the feeling of calmness throughout your whole body.   As you continue to breathe slowly and deeply, relax by doing the following for another 5 to 10 minutes:  · Tighten and relax each muscle group in your body. You can begin at your toes and work your way up to your head. · Imagine your muscle groups relaxing and becoming heavy. · Empty your mind of all thoughts. · Let yourself relax more and more deeply. · Become aware of the state of calmness that surrounds you. · When your relaxation time is over, you can bring yourself back to alertness by moving your fingers and toes and then your hands and feet and then stretching and moving your entire body. Sometimes people fall asleep during relaxation, but they usually wake up shortly afterward. · Always give yourself time to return to full alertness before you drive a car or do anything that might cause an accident if you are not fully alert. Never play a relaxation tape while you drive a car. When should you call for help? Call 911 anytime you think you may need emergency care. For example, call if:  · You feel you cannot stop from hurting yourself or someone else. Keep the numbers for these national suicide hotlines: 7-764-768-TALK (8-528.260.9524) and 1-720-VYSHGNU (4-445.524.1613). If you or someone you know talks about suicide or feeling hopeless, get help right away. Watch closely for changes in your health, and be sure to contact your doctor if:  · You have anxiety or fear that affects your life. · You have symptoms of anxiety that are new or different from those you had before. Where can you learn more? Go to http://lucho-sarkis.info/. Enter P754 in the search box to learn more about \"Anxiety Disorder: Care Instructions. \"  Current as of: July 26, 2016  Content Version: 11.2  © 9261-5059 [employer]. Care instructions adapted under license by [employer] (which disclaims liability or warranty for this information).  If you have questions about a medical condition or this instruction, always ask your healthcare professional. [employer] disclaims any warranty or liability for your use of this information. Hyponatremia: Care Instructions  Your Care Instructions  Hyponatremia (say \"io-vv-nkn-TREE-brittany-uh\") means that you don't have enough sodium in your blood. It can cause nausea, vomiting, and headaches. Or you may not feel hungry. In serious cases, it can cause seizures, a coma, or even death. Hyponatremia is not a disease. It is a problem caused by something else, such as medicines or exercising for a long time in hot weather. You can get hyponatremia if you lose a lot of fluids and then you drink a lot of water or other liquids that don't have much sodium. You can also get it if you have kidney, liver, heart, or other health problems. Treatment is focused on getting your sodium levels back to normal.  Follow-up care is a key part of your treatment and safety. Be sure to make and go to all appointments, and call your doctor if you are having problems. It's also a good idea to know your test results and keep a list of the medicines you take. How can you care for yourself at home? · If your doctor recommends it, drink fluids that have sodium. Sports drinks are a good choice. Or you can eat salty foods. · If your doctor recommends it, limit the amount of water you drink. And limit fluids that are mostly water. These include tea, coffee, and juice. · Take your medicines exactly as prescribed. Call your doctor if you have any problems with your medicine. · Get your sodium levels tested when your doctor tells you to. When should you call for help? Call 911 anytime you think you may need emergency care. For example, call if:  · You have a seizure. · You passed out (lost consciousness). Call your doctor now or seek immediate medical care if:  · You are confused or it is hard to focus. · You have little or no appetite. · You feel sick to your stomach or you vomit. · You have a headache. · You have mood changes.   · You feel more tired than usual.  Watch closely for changes in your health, and be sure to contact your doctor if:  · You do not get better as expected. Where can you learn more? Go to http://lucho-sarkis.info/. Enter S673 in the search box to learn more about \"Hyponatremia: Care Instructions. \"  Current as of: October 14, 2016  Content Version: 11.2  © 9346-8365 [employer]. Care instructions adapted under license by [employer] (which disclaims liability or warranty for this information). If you have questions about a medical condition or this instruction, always ask your healthcare professional. [place] [address] any warranty or liability for your use of this information. Panic Attacks: Care Instructions  Your Care Instructions  During a panic attack, you may have a feeling of intense fear or terror, trouble breathing, chest pain or tightness, heartbeat changes, dizziness, sweating, and shaking. A panic attack starts suddenly and usually lasts from 5 to 20 minutes but may last even longer. You have the most anxiety about 10 minutes after the attack starts. An attack can begin with a stressful event, or it can happen without a cause. Although panic attacks can cause scary symptoms, you can learn to manage them with self-care, counseling, and medicine. Follow-up care is a key part of your treatment and safety. Be sure to make and go to all appointments, and call your doctor if you are having problems. It's also a good idea to know your test results and keep a list of the medicines you take. How can you care for yourself at home? · Take your medicine exactly as directed. Call your doctor if you think you are having a problem with your medicine. · Go to your counseling sessions and follow-up appointments. · Recognize and accept your anxiety. Then, when you are in a situation that makes you anxious, say to yourself, \"This is not an emergency.  I feel uncomfortable, but I am not in danger. I can keep going even if I feel anxious. \"  · Be kind to your body:  ¨ Relieve tension with exercise or a massage. ¨ Get enough rest.  ¨ Avoid alcohol, caffeine, nicotine, and illegal drugs. They can increase your anxiety level, cause sleep problems, or trigger a panic attack. ¨ Learn and do relaxation techniques. See below for more about these techniques. · Engage your mind. Get out and do something you enjoy. Go to a funny movie, or take a walk or hike. Plan your day. Having too much or too little to do can make you anxious. · Keep a record of your symptoms. Discuss your fears with a good friend or family member, or join a support group for people with similar problems. Talking to others sometimes relieves stress. · Get involved in social groups, or volunteer to help others. Being alone sometimes makes things seem worse than they are. · Get at least 30 minutes of exercise on most days of the week to relieve stress. Walking is a good choice. You also may want to do other activities, such as running, swimming, cycling, or playing tennis or team sports. Relaxation techniques  Do relaxation exercises for 10 to 20 minutes a day. You can play soothing, relaxing music while you do them, if you wish. · Tell others in your house that you are going to do your relaxation exercises. Ask them not to disturb you. · Find a comfortable place, away from all distractions and noise. · Lie down on your back, or sit with your back straight. · Focus on your breathing. Make it slow and steady. · Breathe in through your nose. Breathe out through either your nose or mouth. · Breathe deeply, filling up the area between your navel and your rib cage. Breathe so that your belly goes up and down. · Do not hold your breath. · Breathe like this for 5 to 10 minutes. Notice the feeling of calmness throughout your whole body.   As you continue to breathe slowly and deeply, relax by doing the following for another 5 to 10 minutes:  · Tighten and relax each muscle group in your body. You can begin at your toes and work your way up to your head. · Imagine your muscle groups relaxing and becoming heavy. · Empty your mind of all thoughts. · Let yourself relax more and more deeply. · Become aware of the state of calmness that surrounds you. · When your relaxation time is over, you can bring yourself back to alertness by moving your fingers and toes and then your hands and feet and then stretching and moving your entire body. Sometimes people fall asleep during relaxation, but they usually wake up shortly afterward. · Always give yourself time to return to full alertness before you drive a car or do anything that might cause an accident if you are not fully alert. Never play a relaxation tape while driving a car. When should you call for help? Call 911 anytime you think you may need emergency care. For example, call if:  · You feel you cannot stop from hurting yourself or someone else. Watch closely for changes in your health, and be sure to contact your doctor if:  · Your panic attacks get worse. · You have new or different anxiety. · You are not getting better as expected. Where can you learn more? Go to http://lucho-sarkis.info/. Enter H601 in the search box to learn more about \"Panic Attacks: Care Instructions. \"  Current as of: July 26, 2016  Content Version: 11.2  © 2413-2319 [employer]. Care instructions adapted under license by [employer] (which disclaims liability or warranty for this information). If you have questions about a medical condition or this instruction, always ask your healthcare professional. [place] [address] any warranty or liability for your use of this information.

## 2017-04-09 NOTE — ROUTINE PROCESS
Bedside shift change report given to arthur salazar rn (oncoming nurse) by klaudia rn (offgoing nurse). Report included the following information SBAR and Kardex.

## 2017-04-10 VITALS
BODY MASS INDEX: 20.49 KG/M2 | TEMPERATURE: 99.1 F | HEART RATE: 72 BPM | OXYGEN SATURATION: 100 % | DIASTOLIC BLOOD PRESSURE: 65 MMHG | RESPIRATION RATE: 16 BRPM | HEIGHT: 62 IN | WEIGHT: 111.33 LBS | SYSTOLIC BLOOD PRESSURE: 119 MMHG

## 2017-04-10 PROBLEM — R42 DIZZINESS: Status: ACTIVE | Noted: 2017-04-10

## 2017-04-10 PROBLEM — E87.6 HYPOKALEMIA: Status: RESOLVED | Noted: 2017-04-07 | Resolved: 2017-04-09

## 2017-04-10 PROBLEM — N32.89 IRRITABLE BLADDER: Chronic | Status: ACTIVE | Noted: 2017-04-10

## 2017-04-10 PROBLEM — F41.0 PANIC ANXIETY SYNDROME: Chronic | Status: ACTIVE | Noted: 2017-04-10

## 2017-04-10 PROBLEM — E87.1 HYPONATREMIA: Status: ACTIVE | Noted: 2017-04-07

## 2017-04-10 LAB
ANION GAP BLD CALC-SCNC: 7 MMOL/L (ref 5–15)
BUN SERPL-MCNC: 5 MG/DL (ref 6–20)
BUN/CREAT SERPL: 10 (ref 12–20)
CALCIUM SERPL-MCNC: 8.5 MG/DL (ref 8.5–10.1)
CHLORIDE SERPL-SCNC: 98 MMOL/L (ref 97–108)
CO2 SERPL-SCNC: 26 MMOL/L (ref 21–32)
CREAT SERPL-MCNC: 0.49 MG/DL (ref 0.55–1.02)
ERYTHROCYTE [DISTWIDTH] IN BLOOD BY AUTOMATED COUNT: 13.2 % (ref 11.5–14.5)
GLUCOSE SERPL-MCNC: 87 MG/DL (ref 65–100)
HCT VFR BLD AUTO: 40.4 % (ref 35–47)
HGB BLD-MCNC: 13.4 G/DL (ref 11.5–16)
MCH RBC QN AUTO: 28.6 PG (ref 26–34)
MCHC RBC AUTO-ENTMCNC: 33.2 G/DL (ref 30–36.5)
MCV RBC AUTO: 86.3 FL (ref 80–99)
PLATELET # BLD AUTO: 300 K/UL (ref 150–400)
POTASSIUM SERPL-SCNC: 4.1 MMOL/L (ref 3.5–5.1)
RBC # BLD AUTO: 4.68 M/UL (ref 3.8–5.2)
SODIUM SERPL-SCNC: 131 MMOL/L (ref 136–145)
WBC # BLD AUTO: 7.8 K/UL (ref 3.6–11)

## 2017-04-10 PROCEDURE — 74011250637 HC RX REV CODE- 250/637: Performed by: NURSE PRACTITIONER

## 2017-04-10 PROCEDURE — 36415 COLL VENOUS BLD VENIPUNCTURE: CPT | Performed by: INTERNAL MEDICINE

## 2017-04-10 PROCEDURE — 74011250637 HC RX REV CODE- 250/637: Performed by: INTERNAL MEDICINE

## 2017-04-10 PROCEDURE — 80048 BASIC METABOLIC PNL TOTAL CA: CPT | Performed by: INTERNAL MEDICINE

## 2017-04-10 PROCEDURE — 85027 COMPLETE CBC AUTOMATED: CPT | Performed by: INTERNAL MEDICINE

## 2017-04-10 PROCEDURE — 74011250636 HC RX REV CODE- 250/636: Performed by: NURSE PRACTITIONER

## 2017-04-10 RX ORDER — ONDANSETRON 2 MG/ML
4 INJECTION INTRAMUSCULAR; INTRAVENOUS
Status: DISCONTINUED | OUTPATIENT
Start: 2017-04-10 | End: 2017-04-10 | Stop reason: HOSPADM

## 2017-04-10 RX ORDER — AMLODIPINE BESYLATE 5 MG/1
10 TABLET ORAL DAILY
Status: DISCONTINUED | OUTPATIENT
Start: 2017-04-11 | End: 2017-04-10 | Stop reason: HOSPADM

## 2017-04-10 RX ORDER — AMLODIPINE BESYLATE 10 MG/1
10 TABLET ORAL DAILY
Qty: 30 TAB | Refills: 0 | Status: SHIPPED
Start: 2017-04-10

## 2017-04-10 RX ADMIN — AMLODIPINE BESYLATE 5 MG: 5 TABLET ORAL at 08:44

## 2017-04-10 RX ADMIN — FAMOTIDINE 20 MG: 20 TABLET ORAL at 08:43

## 2017-04-10 RX ADMIN — LORAZEPAM 0.5 MG: 0.5 TABLET ORAL at 08:44

## 2017-04-10 RX ADMIN — METOPROLOL SUCCINATE 25 MG: 25 TABLET, EXTENDED RELEASE ORAL at 08:44

## 2017-04-10 RX ADMIN — THERA TABS 1 TABLET: TAB at 08:44

## 2017-04-10 RX ADMIN — CLORAZEPATE DIPOTASSIUM 3.75 MG: 3.75 TABLET ORAL at 08:44

## 2017-04-10 RX ADMIN — Medication 10 ML: at 06:55

## 2017-04-10 RX ADMIN — LISINOPRIL 5 MG: 5 TABLET ORAL at 08:43

## 2017-04-10 RX ADMIN — CETIRIZINE HYDROCHLORIDE 10 MG: 10 TABLET, FILM COATED ORAL at 08:43

## 2017-04-10 RX ADMIN — MONTELUKAST SODIUM 10 MG: 10 TABLET, FILM COATED ORAL at 08:43

## 2017-04-10 RX ADMIN — SODIUM CHLORIDE 75 ML/HR: 900 INJECTION, SOLUTION INTRAVENOUS at 07:24

## 2017-04-10 RX ADMIN — LORAZEPAM 0.5 MG: 0.5 TABLET ORAL at 12:06

## 2017-04-10 NOTE — PROGRESS NOTES
Bedside shift change report given to Twan (oncoming nurse) by Abdelrahman Rudd (offgoing nurse). Report included the following information SBAR, Kardex, MAR and Recent Results.

## 2017-04-10 NOTE — PROGRESS NOTES
Bedside and Verbal shift change report given to Elvia (oncoming nurse) by Binh Mojica (offgoing nurse). Report included the following information SBAR, Kardex and MAR.

## 2017-04-10 NOTE — PROGRESS NOTES
Hospitalist Progress Note  Office: [phone]      Date of Service:  4/10/2017  NAME:  Guille Ramirez  :  1955  MRN:  [id number]      Admission Summary:   61 yo woman with panic/anxiety disorder, arthritis, HTN, and asthma was BIBEMS from home on  with dizziness and elevated blood pressure. She was admitted with hypertensive urgency and dizziness. Interval history / Subjective:   Still c/o dizziness on standing, nausea but no vomiting, chronic arthritis pains, stomach pains; had BM yesterday; keeps asking about getting \"black wheelchair with rubber wheels\" and states she gets panic attacks in ambulances     Assessment & Plan:     Hypertensive urgency (POA)  - resolved  - takes Toprol XL 25 mg daily  - started on amlodipine 5, lisinopril 5 here  - will stop lisinopril and increase amlodipine      Dizziness (POA)  - likely multifactorial including hyponatremia, anxiety, uncontrolled HTN  - improved  - check orthostatic vitals  - stop IVF  - PT/OT re-evals today  - likely needs SNF      Chronic panic/anxiety disorder  - continue meds as prescribed  - Psychiatry consulted to recommend suitable regimen: suggested that BZDs were titrated down too quickly; recommended increase Ativan to 0.5 mg TID for now and weaning off Paxil while starting and titrating up Zoloft on an outpatient basis with her psychiatrist      Irritable bladder - stable; ?previous notes report she is on Detrol LA but not in PTA med list      Hypokalemia (POA) - repleted, resolved       Hyponatremia, recurrent (POA)  - 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  SNF eval. Wants \"wheelchair with rubber wheels\" on discharge     Hospital Problems  Date Reviewed: 4/9/2017          Codes Class Noted POA    Anxiety associated with depression ICD-10-CM: F41.8  ICD-9-CM: 300.4  4/8/2017 Yes            Review of Systems:   Pertinent items are noted in HPI. Vital Signs:    Last 24hrs VS reviewed since prior progress note. Most recent are:  Visit Vitals    /65    Pulse 72    Temp 99.1 °F (37.3 °C)    Resp 16    Ht 5' 2\" (1.575 m)    Wt 50.5 kg (111 lb 5.3 oz)    SpO2 100%    BMI 20.36 kg/m2       Intake/Output Summary (Last 24 hours) at 04/10/17 0156  Last data filed at 04/10/17 0739   Gross per 24 hour   Intake              960 ml   Output                0 ml   Net              960 ml     Physical Examination:     Constitutional:  awake, no acute distress, cooperative, pleasant, anxious-appearing   ENT:  oral mucous moist, oropharynx benign  Neck supple, no masses   Resp:  CTA bilaterally, no wheezing/rhonchi/rales   CV:  regular rhythm, normal rate, no m/r/g appreciated, no edema    GI:  +BS, soft, non distended, non tender     Musculoskeletal:  moves all extremities    Neurologic:  AOx3, NFD     Psych:  anxious affect  Skin:  warm, dry  Eyes:  PERRL, EOMI    Data Review:    Review and/or order of clinical lab test  Review and/or order of tests in the radiology section of CPT  Review and/or order of tests in the medicine section of CPT    Labs:     Recent Labs      04/10/17   0047  04/09/17   0224   WBC  7.8  8.2   HGB  13.4  14.4   HCT  40.4  42.5   PLT  300  324     Recent Labs      04/10/17   0047  04/09/17   0224  04/08/17   0423   NA  131*  132*  132*   K  4.1  3.9  3.3*   CL  98  97  98   CO2  26  27  22   BUN  5*  4*  4*   CREA  0.49*  0.44*  0.55   GLU  87  91  91   CA  8.5  8.9  8.7     No results for input(s): SGOT, GPT, ALT, AP, TBIL, TBILI, TP, ALB, GLOB, GGT, AML, LPSE in the last 72 hours.     No lab exists for component: AMYP, HLPSE  No results for input(s): INR, PTP, APTT in the last 72 hours. No lab exists for component: INREXT   No results for input(s): FE, TIBC, PSAT, FERR in the last 72 hours. No results found for: FOL, RBCF   No results for input(s): PH, PCO2, PO2 in the last 72 hours.   Recent Labs      04/07/17   2226   TROIQ  <0.04     No results found for: CHOL, CHOLX, CHLST, CHOLV, HDL, LDL, DLDL, LDLC, DLDLP, TGL, TGLX, TRIGL, TRIGP, CHHD, CHHDX  Lab Results   Component Value Date/Time    Glucose (POC) 113 04/07/2017 03:43 PM    Glucose (POC) 105 05/07/2010 11:44 AM     Lab Results   Component Value Date/Time    Color YELLOW/STRAW 04/07/2017 05:09 PM    Appearance CLEAR 04/07/2017 05:09 PM    Specific gravity 1.007 04/07/2017 05:09 PM    pH (UA) 7.5 04/07/2017 05:09 PM    Protein NEGATIVE  04/07/2017 05:09 PM    Glucose NEGATIVE  04/07/2017 05:09 PM    Ketone NEGATIVE  04/07/2017 05:09 PM    Bilirubin NEGATIVE  04/07/2017 05:09 PM    Urobilinogen 0.2 04/07/2017 05:09 PM    Nitrites NEGATIVE  04/07/2017 05:09 PM    Leukocyte Esterase NEGATIVE  04/07/2017 05:09 PM    Epithelial cells FEW 03/19/2014 11:25 AM    Bacteria NEGATIVE  03/19/2014 11:25 AM    WBC 0-4 03/19/2014 11:25 AM    RBC 0-5 03/19/2014 11:25 AM     Medications Reviewed:     Current Facility-Administered Medications   Medication Dose Route Frequency    LORazepam (ATIVAN) tablet 0.5 mg  0.5 mg Oral TID    PARoxetine (PAXIL) tablet 20 mg  20 mg Oral QHS    montelukast (SINGULAIR) tablet 10 mg  10 mg Oral DAILY    metoprolol succinate (TOPROL-XL) XL tablet 25 mg  25 mg Oral DAILY    clorazepate (TRANXENE) tablet 3.75 mg  3.75 mg Oral TID    atorvastatin (LIPITOR) tablet 20 mg  20 mg Oral QHS    sodium chloride (OCEAN) 0.65 % nasal spray 1 Spray  1 Spray Both Nostrils PRN    acetaminophen (TYLENOL) tablet 325 mg  325 mg Oral DAILY PRN    famotidine (PEPCID) tablet 20 mg  20 mg Oral DAILY    cetirizine (ZYRTEC) tablet 10 mg  10 mg Oral DAILY    therapeutic multivitamin (THERAGRAN) tablet 1 Tab  1 Tab Oral DAILY    sodium chloride (NS) flush 5-10 mL  5-10 mL IntraVENous Q8H    sodium chloride (NS) flush 5-10 mL  5-10 mL IntraVENous PRN    LORazepam (ATIVAN) tablet 0.5 mg  0.5 mg Oral BID PRN    enoxaparin (LOVENOX) injection 40 mg  40 mg SubCUTAneous Q24H    amLODIPine (NORVASC) tablet 5 mg  5 mg Oral DAILY    lisinopril (PRINIVIL, ZESTRIL) tablet 5 mg  5 mg Oral DAILY    sodium chloride (NS) flush 5-10 mL  5-10 mL IntraVENous Q8H    sodium chloride (NS) flush 5-10 mL  5-10 mL IntraVENous PRN     ______________________________________________________________________  EXPECTED LENGTH OF STAY: - - -  ACTUAL LENGTH OF STAY:          3                 Michelle Bergeron MD

## 2017-04-10 NOTE — PROGRESS NOTES
Called report to Randee Mahan RN at [facility], nurse familiar with patient. IV removed, and belongings are packed with patient. No questions at this time.

## 2017-06-10 ENCOUNTER — HOSPITAL ENCOUNTER (EMERGENCY)
Age: 62
Discharge: HOME | End: 2017-06-10
Attending: EMERGENCY MEDICINE
Payer: MEDICARE

## 2017-06-10 VITALS
RESPIRATION RATE: 16 BRPM | DIASTOLIC BLOOD PRESSURE: 82 MMHG | WEIGHT: 104 LBS | SYSTOLIC BLOOD PRESSURE: 151 MMHG | OXYGEN SATURATION: 98 % | TEMPERATURE: 98.5 F | BODY MASS INDEX: 19.14 KG/M2 | HEIGHT: 62 IN | HEART RATE: 74 BPM

## 2017-06-10 DIAGNOSIS — F41.1 ANXIETY REACTION: Primary | ICD-10-CM

## 2017-06-10 LAB
ALBUMIN SERPL BCP-MCNC: 3.7 G/DL (ref 3.5–5)
ALBUMIN/GLOB SERPL: 1.2 {RATIO} (ref 1.1–2.2)
ALP SERPL-CCNC: 78 U/L (ref 45–117)
ALT SERPL-CCNC: 19 U/L (ref 12–78)
AMPHET UR QL SCN: NEGATIVE
ANION GAP BLD CALC-SCNC: 8 MMOL/L (ref 5–15)
APPEARANCE UR: CLEAR
AST SERPL W P-5'-P-CCNC: 10 U/L (ref 15–37)
BACTERIA URNS QL MICRO: NEGATIVE /HPF
BARBITURATES UR QL SCN: NEGATIVE
BASOPHILS # BLD AUTO: 0 K/UL (ref 0–0.1)
BASOPHILS # BLD: 0 % (ref 0–1)
BENZODIAZ UR QL: POSITIVE
BILIRUB SERPL-MCNC: 0.2 MG/DL (ref 0.2–1)
BILIRUB UR QL: NEGATIVE
BUN SERPL-MCNC: 9 MG/DL (ref 6–20)
BUN/CREAT SERPL: 17 (ref 12–20)
CALCIUM SERPL-MCNC: 9.1 MG/DL (ref 8.5–10.1)
CANNABINOIDS UR QL SCN: NEGATIVE
CHLORIDE SERPL-SCNC: 98 MMOL/L (ref 97–108)
CO2 SERPL-SCNC: 27 MMOL/L (ref 21–32)
COCAINE UR QL SCN: NEGATIVE
COLOR UR: ABNORMAL
CREAT SERPL-MCNC: 0.53 MG/DL (ref 0.55–1.02)
DRUG SCRN COMMENT,DRGCM: ABNORMAL
EOSINOPHIL # BLD: 0.1 K/UL (ref 0–0.4)
EOSINOPHIL NFR BLD: 1 % (ref 0–7)
EPITH CASTS URNS QL MICRO: ABNORMAL /LPF
ERYTHROCYTE [DISTWIDTH] IN BLOOD BY AUTOMATED COUNT: 13.3 % (ref 11.5–14.5)
ETHANOL SERPL-MCNC: <10 MG/DL
GLOBULIN SER CALC-MCNC: 3.1 G/DL (ref 2–4)
GLUCOSE SERPL-MCNC: 87 MG/DL (ref 65–100)
GLUCOSE UR STRIP.AUTO-MCNC: NEGATIVE MG/DL
HCT VFR BLD AUTO: 40 % (ref 35–47)
HGB BLD-MCNC: 13.6 G/DL (ref 11.5–16)
HGB UR QL STRIP: NEGATIVE
HYALINE CASTS URNS QL MICRO: ABNORMAL /LPF (ref 0–5)
KETONES UR QL STRIP.AUTO: NEGATIVE MG/DL
LEUKOCYTE ESTERASE UR QL STRIP.AUTO: NEGATIVE
LYMPHOCYTES # BLD AUTO: 15 % (ref 12–49)
LYMPHOCYTES # BLD: 1.2 K/UL (ref 0.8–3.5)
MCH RBC QN AUTO: 29.6 PG (ref 26–34)
MCHC RBC AUTO-ENTMCNC: 34 G/DL (ref 30–36.5)
MCV RBC AUTO: 87.1 FL (ref 80–99)
METHADONE UR QL: NEGATIVE
MONOCYTES # BLD: 0.7 K/UL (ref 0–1)
MONOCYTES NFR BLD AUTO: 8 % (ref 5–13)
NEUTS SEG # BLD: 6.1 K/UL (ref 1.8–8)
NEUTS SEG NFR BLD AUTO: 76 % (ref 32–75)
NITRITE UR QL STRIP.AUTO: NEGATIVE
OPIATES UR QL: NEGATIVE
PCP UR QL: NEGATIVE
PH UR STRIP: 7 [PH] (ref 5–8)
PLATELET # BLD AUTO: 304 K/UL (ref 150–400)
POTASSIUM SERPL-SCNC: 3.9 MMOL/L (ref 3.5–5.1)
PROT SERPL-MCNC: 6.8 G/DL (ref 6.4–8.2)
PROT UR STRIP-MCNC: 30 MG/DL
RBC # BLD AUTO: 4.59 M/UL (ref 3.8–5.2)
RBC #/AREA URNS HPF: ABNORMAL /HPF (ref 0–5)
SODIUM SERPL-SCNC: 133 MMOL/L (ref 136–145)
SP GR UR REFRACTOMETRY: 1.01 (ref 1–1.03)
UROBILINOGEN UR QL STRIP.AUTO: 0.2 EU/DL (ref 0.2–1)
WBC # BLD AUTO: 8 K/UL (ref 3.6–11)
WBC URNS QL MICRO: ABNORMAL /HPF (ref 0–4)

## 2017-06-10 PROCEDURE — 36415 COLL VENOUS BLD VENIPUNCTURE: CPT | Performed by: EMERGENCY MEDICINE

## 2017-06-10 PROCEDURE — 81001 URINALYSIS AUTO W/SCOPE: CPT | Performed by: EMERGENCY MEDICINE

## 2017-06-10 PROCEDURE — 77030029684 HC NEB SM VOL KT MONA -A

## 2017-06-10 PROCEDURE — 80307 DRUG TEST PRSMV CHEM ANLYZR: CPT | Performed by: EMERGENCY MEDICINE

## 2017-06-10 PROCEDURE — 85025 COMPLETE CBC W/AUTO DIFF WBC: CPT | Performed by: EMERGENCY MEDICINE

## 2017-06-10 PROCEDURE — 90791 PSYCH DIAGNOSTIC EVALUATION: CPT

## 2017-06-10 PROCEDURE — 80053 COMPREHEN METABOLIC PANEL: CPT | Performed by: EMERGENCY MEDICINE

## 2017-06-10 PROCEDURE — 99285 EMERGENCY DEPT VISIT HI MDM: CPT

## 2017-06-10 RX ORDER — LORAZEPAM 0.5 MG/1
0.5 TABLET ORAL
Qty: 24 TAB | Refills: 0 | Status: SHIPPED | OUTPATIENT
Start: 2017-06-10

## 2017-06-10 RX ORDER — CLORAZEPATE DIPOTASSIUM 3.75 MG/1
3.75 TABLET ORAL
Qty: 15 TAB | Refills: 0 | Status: SHIPPED | OUTPATIENT
Start: 2017-06-10

## 2017-06-10 NOTE — ED PROVIDER NOTES
HPI Comments: 62 y.o. female with past medical history significant for HTN, anxiety, panic attacks, asthma, and arthritis who presents from home with chief complaint of anxiety. Pt states that she has been more anxious recently which is making her depressed. Pt reports that her psychiatrist retired in January (6 months ago) and she has been unable to get an appointment with a new psychiatrist for medication refills. She complains of some abdominal \"soreness. \" Pt reports drinking 64 oz of water daily - trying to keep it down. Has hx of drinking a lot of water. She has an appointment to see a psychiatrist on 6/19/17. There are no other acute medical concerns at this time. Social hx: nonsmoker, no EtOH use, no drug use  PCP: MICHELLE Do  Review of Records: Pt admitted from 4/7/17-4/10/17 for HTN urgency and dizziness. Pt also had hypernatremia and was treated with water restriction. Note written by Aden Rodriguez, as dictated by Jessi Hall DO 11:42 AM      The history is provided by the patient. No  was used. Past Medical History:   Diagnosis Date    Arthritis     Asthma     Hypertension     Psychiatric disorder     anxiety and panic attacks       Past Surgical History:   Procedure Laterality Date    HX CHOLECYSTECTOMY      HX GYN      hysterectomy    HX HEENT      tonsillectomy    HX ORTHOPAEDIC      Bilat ankle scar tissue removal    [place] INCISE/CRYOSURG LESN BLADDER           History reviewed. No pertinent family history. Social History     Social History    Marital status:      Spouse name: N/A    Number of children: N/A    Years of education: N/A     Occupational History    Not on file.      Social History Main Topics    Smoking status: Never Smoker    Smokeless tobacco: Not on file    Alcohol use No    Drug use: No    Sexual activity: Not on file     Other Topics Concern    Not on file     Social History Narrative ALLERGIES: Sulfa (sulfonamide antibiotics); Alcohol; Influenza virus vaccine, specific; and Tetracycline    Review of Systems   Gastrointestinal: Positive for abdominal pain. Psychiatric/Behavioral: The patient is nervous/anxious. All other systems reviewed and are negative. Vitals:    06/10/17 1109 06/10/17 1115   BP: 187/69 133/74   Pulse: 90    Resp: 24    Temp: 98.2 °F (36.8 °C)    SpO2: 98% 98%   Weight: 47.2 kg (104 lb)    Height: 5' 2\" (1.575 m)             Physical Exam      Constitutional: Pt is awake and alert. NAD. Frail, elderly. HENT:   Head: Normocephalic and atraumatic. Nose: Nose normal.   Mouth/Throat: Oropharynx is clear and moist. No oropharyngeal exudate. Eyes: Conjunctivae and extraocular motions are normal. Pupils are equal, round, and reactive to light. Right eye exhibits no discharge. Left eye exhibits no discharge. No scleral icterus. Neck: No tracheal deviation present. Supple neck. Cardiovascular: Normal rate, regular rhythm, normal heart sounds and intact distal pulses. Exam reveals no gallop and no friction rub. No murmur heard. Pulmonary/Chest: Effort normal and breath sounds normal.  Pt  has no wheezes. Pt  has no rales. Abdominal: Soft. Pt  exhibits no distension and no mass. Pt  has no rebound and no guarding. Minimal diffuse tenderness. Musculoskeletal:  Pt  exhibits no edema and no tenderness. Ext: Normal ROM in all four extremities; not tender to palpation; distal pulses are normal, no edema. Neurological:  Pt is alert. nonfocal neuro exam.  Skin: Skin is warm and dry. Pt  is not diaphoretic. Psychiatric:  Pt appears sad and anxious. Behavior is normal. Not suicidal.   Note written by Aden Patterson, as dictated by Adam Jordan DO 11:42 AM    [facility]  ED Course       Procedures  PROGRESS NOTE:  1:41 PM Pt has been seen and evaluated by [facility].            [employer] gave her resources    Has plenty of paxil left  5 ativan left - will give 8 days worth untils she sees a [facility] (has appt on June 19 according to .)  12 tranxene left - will give 5 days to get her to June 19 appt. Labs Reviewed   CBC WITH AUTOMATED DIFF - Abnormal; Notable for the following:        Result Value    NEUTROPHILS 76 (*)     All other components within normal limits   METABOLIC PANEL, COMPREHENSIVE - Abnormal; Notable for the following:     Sodium 133 (*)     Creatinine 0.53 (*)     AST (SGOT) 10 (*)     All other components within normal limits   URINALYSIS W/ RFLX MICROSCOPIC - Abnormal; Notable for the following:     Protein 30 (*)     All other components within normal limits   DRUG SCREEN, URINE - Abnormal; Notable for the following:     BENZODIAZEPINE POSITIVE (*)     All other components within normal limits   ETHYL ALCOHOL   SAMPLES BEING HELD       No workup needed for abd pain.   Soft benign exam.

## 2017-06-10 NOTE — ED TRIAGE NOTES
Triage: pt arrives by POV. \"I was really really weak Im so scared Im going to fall. I've been taking paxil for years. My psychiatrist just retired and Monday I'll run out of medications\" Pt extremely anxious and tearful. Hx of panic disorder.

## 2017-06-10 NOTE — BSMART NOTE
Behavioral Health    Comprehensive Assessment Form Part 1      Section I - Disposition    Axis I - Anxiety disorder NOS   Axis II - Deferred  Axis III - Hypertension, Arthritis, Asthma  Axis IV - Problems related to the social environment  Axis V - 46      The Medical Doctor to Psychiatrist conference was not completed. The Medical Doctor is in agreement with Psychiatrist disposition because of (reason) inpatient treatment not warranted at this time. The plan is discharge. Patient to follow up with psychiatric appointment on 6/19/17. She was also given referrals for other psychiatric providers and therapists. She and her  were in agreement with this plan. The on-call Psychiatrist consulted was Dr. Jim Healy. The admitting Psychiatrist will be Dr. Andrea Sanchez. The admitting Diagnosis is n/a. The Payor source is [place] [employer]. Section II - Integrated Summary  Summary:  Patient reported having \"severe panic attacks\" which force her to her knees and prevent her from even going into her kitchen for meals. She indicated that her psychiatrist retired in January 2017. She began seeing another psychiatrist but discontinued seeing her because she did not like the changes made to her medication. Patient's PCP has been prescribing her medication until recently, and patient has a new patient appointment with a psychiatric physician's assistant on  6/19/17. Patient said that she does not have enough of her medication to last until that appointment. Both the patient and her  discussed multiple somatic issues that were not clearly related to the patient's anxiety. They also expressed the belief that they would be able to get another provider to see them prior to 6/19. This writer discussed the importance of keeping her 6/19 appointment and working with her provider to get her medication right as the process can take time.   She was also encouraged to see a therapist in order to develop additional skills, other than medication, to cope with her anxiety. The patient has demonstrated mental capacity to provide informed consent. The information is given by the patient, spouse and past medical records. The Chief Complaint is panic attacks, running out of medication. The Precipitant Factors are change in psychistrist.  Previous Hospitalizations: [facility] (age 23), [facility] (1980s)  It is unknown if the patient has previously been in restraints. Current Psychiatrist and/or  is none. Patient has new patient appointment with a Psychiatric Physician's Assistant on 6/19/17. Lethality Assessment:    The potential for suicide noted by the following: not noted. The potential for homicide is not noted. The patient denied any current suicidal or homicidal ideation or any history of suicide attempt. The patient has not been a perpetrator of sexual or physical abuse. There are not pending charges. The patient is not felt to be at risk for self harm or harm to others. The attending nurse was advised. Section III - Psychosocial  The patient's overall mood and attitude is anxious but cooperative. Feelings of helplessness and hopelessness are not observed. Generalized anxiety is not observed. Panic is not observed. Phobias are not observed. Obsessive compulsive tendencies are not observed. Section IV - Mental Status Exam  The patient's appearance shows no evidence of impairment. The patient's behavior shows no evidence of impairment. The patient appears oriented to time, place, person and situation. The patient's speech shows no evidence of impairment. The patient's mood is euthymic. The range of affect shows no evidence of impairment. The patient's thought content demonstrates no evidence of impairment. The thought process shows no evidence of impairment. The patient's perception shows no evidence of impairment. The patient's memory shows no evidence of impairment.   The patient's appetite shows no evidence of impairment. The patient's sleep shows no evidence of impairment. The patient shows little insight. The patient's judgment shows no evidence of impairment. Section V - Substance Abuse  The patient is not using substances. Section VI - Living Arrangements  The patient is . The spouse's approximate age is 58 and appears to be in fair health. The patient lives with a spouse. The patient has no children. The patient does plan to return home upon discharge. The patient does not have legal issues pending. The patient's source of income comes from disability and family. Anabaptist and cultural practices have been noted and include: an interest in attending Christianity. The patient's greatest support comes from her  and this person will be involved with the treatment. The patient has not been in an event described as horrible or outside the realm of ordinary life experience either currently or in the past.  The patient has not been a victim of sexual/physical abuse. Section VII - Other Areas of Clinical Concern  The highest grade achieved is unknown. The patient is currently disabled and speaks [place] as a primary language. The patient has no communication impairments affecting communication. The patient's preference for learning can be described as: can read and write adequately. The patient's hearing is normal.  The patient's vision is impaired and wears glasses or contacts.       [address]

## 2017-06-10 NOTE — DISCHARGE INSTRUCTIONS
We hope that we have addressed all of your medical concerns. The examination and treatment you received in the Emergency Department were for an emergent problem and were not intended as complete care. It is important that you follow up with your healthcare provider(s) for ongoing care. If your symptoms worsen or do not improve as expected, and you are unable to reach your usual health care provider(s), you should return to the Emergency Department. Today's healthcare is undergoing tremendous change, and patient satisfaction surveys are one of the many tools to assess the quality of medical care. You may receive a survey from the [facility] regarding your experience in the Emergency Department. I hope that your experience has been completely positive, particularly the medical care that I provided. As such, please participate in the survey; anything less than excellent does not meet my expectations or intentions. [facility] and [address] [place] [address] participate in nationally recognized quality of care measures. If your blood pressure is greater than 120/80, as reported below, we urge that you seek medical care to address the potential of high blood pressure, commonly known as hypertension. Hypertension can be hereditary or can be caused by certain medical conditions, pain, stress, or \"white coat syndrome. \"       Please make an appointment with your health care provider(s) for follow up of your Emergency Department visit. VITALS:   Patient Vitals for the past 8 hrs:   Temp Pulse Resp BP SpO2   06/10/17 1115 - - - 133/74 98 %   06/10/17 1109 98.2 °F (36.8 °C) 90 24 187/69 98 %          Thank you for allowing us to provide you with medical care today. We realize that you have many choices for your emergency care needs. Please choose us in the future for any continued health care needs.       DO [facility].   Office: [phone]            Recent Results (from the past 24 hour(s))   CBC WITH AUTOMATED DIFF    Collection Time: 06/10/17 12:46 PM   Result Value Ref Range    WBC 8.0 3.6 - 11.0 K/uL    RBC 4.59 3.80 - 5.20 M/uL    HGB 13.6 11.5 - 16.0 g/dL    HCT 40.0 35.0 - 47.0 %    MCV 87.1 80.0 - 99.0 FL    MCH 29.6 26.0 - 34.0 PG    MCHC 34.0 30.0 - 36.5 g/dL    RDW 13.3 11.5 - 14.5 %    PLATELET 643 139 - 400 K/uL    NEUTROPHILS 76 (H) 32 - 75 %    LYMPHOCYTES 15 12 - 49 %    MONOCYTES 8 5 - 13 %    EOSINOPHILS 1 0 - 7 %    BASOPHILS 0 0 - 1 %    ABS. NEUTROPHILS 6.1 1.8 - 8.0 K/UL    ABS. LYMPHOCYTES 1.2 0.8 - 3.5 K/UL    ABS. MONOCYTES 0.7 0.0 - 1.0 K/UL    ABS. EOSINOPHILS 0.1 0.0 - 0.4 K/UL    ABS. BASOPHILS 0.0 0.0 - 0.1 K/UL   METABOLIC PANEL, COMPREHENSIVE    Collection Time: 06/10/17 12:46 PM   Result Value Ref Range    Sodium 133 (L) 136 - 145 mmol/L    Potassium 3.9 3.5 - 5.1 mmol/L    Chloride 98 97 - 108 mmol/L    CO2 27 21 - 32 mmol/L    Anion gap 8 5 - 15 mmol/L    Glucose 87 65 - 100 mg/dL    BUN 9 6 - 20 MG/DL    Creatinine 0.53 (L) 0.55 - 1.02 MG/DL    BUN/Creatinine ratio 17 12 - 20      GFR est AA >60 >60 ml/min/1.73m2    GFR est non-AA >60 >60 ml/min/1.73m2    Calcium 9.1 8.5 - 10.1 MG/DL    Bilirubin, total 0.2 0.2 - 1.0 MG/DL    ALT (SGPT) 19 12 - 78 U/L    AST (SGOT) 10 (L) 15 - 37 U/L    Alk.  phosphatase 78 45 - 117 U/L    Protein, total 6.8 6.4 - 8.2 g/dL    Albumin 3.7 3.5 - 5.0 g/dL    Globulin 3.1 2.0 - 4.0 g/dL    A-G Ratio 1.2 1.1 - 2.2     ETHYL ALCOHOL    Collection Time: 06/10/17 12:46 PM   Result Value Ref Range    ALCOHOL(ETHYL),SERUM <10 <10 MG/DL   URINALYSIS W/ RFLX MICROSCOPIC    Collection Time: 06/10/17 12:46 PM   Result Value Ref Range    Color YELLOW/STRAW      Appearance CLEAR CLEAR      Specific gravity 1.013 1.003 - 1.030      pH (UA) 7.0 5.0 - 8.0      Protein 30 (A) NEG mg/dL    Glucose NEGATIVE  NEG mg/dL    Ketone NEGATIVE  NEG mg/dL Bilirubin NEGATIVE  NEG      Blood NEGATIVE  NEG      Urobilinogen 0.2 0.2 - 1.0 EU/dL    Nitrites NEGATIVE  NEG      Leukocyte Esterase NEGATIVE  NEG      WBC 0-4 0 - 4 /hpf    RBC 0-5 0 - 5 /hpf    Epithelial cells FEW FEW /lpf    Bacteria NEGATIVE  NEG /hpf    Hyaline cast 0-2 0 - 5 /lpf   DRUG SCREEN, URINE    Collection Time: 06/10/17 12:46 PM   Result Value Ref Range    AMPHETAMINE NEGATIVE  NEG      BARBITURATES NEGATIVE  NEG      BENZODIAZEPINE POSITIVE (A) NEG      COCAINE NEGATIVE  NEG      METHADONE NEGATIVE  NEG      OPIATES NEGATIVE  NEG      PCP(PHENCYCLIDINE) NEGATIVE  NEG      THC (TH-CANNABINOL) NEGATIVE  NEG      Drug screen comment (NOTE)        No results found. Learning About Anxiety Disorders  What are anxiety disorders? Anxiety disorders are a type of medical problem. They cause severe anxiety. When you feel anxious, you feel that something bad is about to happen. This feeling interferes with your life. These disorders include:  · Generalized anxiety disorder. You feel worried and stressed about many everyday events and activities. This goes on for several months and disrupts your life on most days. · Panic disorder. You have repeated panic attacks. A panic attack is a sudden, intense fear or anxiety. It may make you feel short of breath. Your heart may pound. · Social anxiety disorder. You feel very anxious about what you will say or do in front of people. For example, you may be scared to talk or eat in public. This problem affects your daily life. · Phobias. You are very scared of a specific object, situation, or activity. For example, you may fear spiders, high places, or small spaces. What are the symptoms? Generalized anxiety disorder  Symptoms may include:  · Feeling worried and stressed about many things almost every day. · Feeling tired or irritable. You may have a hard time concentrating. · Having headaches or muscle aches.   · Having a hard time swallowing. · Feeling shaky, sweating, or having hot flashes. Panic disorder  You may have repeated panic attacks when there is no reason for feeling afraid. You may change your daily activities because you worry that you will have another attack. Symptoms may include:  · Intense fear, terror, or anxiety. · Trouble breathing or very fast breathing. · Chest pain or tightness. · A heartbeat that races or is not regular. Social anxiety disorder  Symptoms may include:  · Fear about a social situation, such as eating in front of others or speaking in public. You may worry a lot. Or you may be afraid that something bad will happen. · Anxiety that can cause you to blush, sweat, and feel shaky. · A heartbeat that is faster than normal.  · A hard time focusing. Phobias  Symptoms may include:  · More fear than most people of being around an object, being in a situation, or doing an activity. You might also be stressed about the chance of being around the thing you fear. · Worry about losing control, panicking, fainting, or having physical symptoms like a faster heartbeat when you are around the situation or object. How are these disorders treated? Anxiety disorders can be treated with medicines or counseling. A combination of both may be used. Medicines may include:  · Antidepressants. These may help your symptoms by keeping chemicals in your brain in balance. · Benzodiazepines. These may give you short-term relief of your symptoms. Some people use cognitive-behavioral therapy. A therapist helps you learn to change stressful or bad thoughts into helpful thoughts. Lead a healthy lifestyle  A healthy lifestyle may help you feel better. · Get at least 30 minutes of exercise on most days of the week. Walking is a good choice. · Eat a healthy diet. Include fruits, vegetables, lean proteins, and whole grains in your diet each day. · Try to go to bed at the same time every night.  Try for 8 hours of sleep a night.  · Find ways to manage stress. Try relaxation exercises. · Avoid alcohol and illegal drugs. Follow-up care is a key part of your treatment and safety. Be sure to make and go to all appointments, and call your doctor if you are having problems. It's also a good idea to know your test results and keep a list of the medicines you take. Where can you learn more? Go to http://lucho-sarkis.info/. Enter O883 in the search box to learn more about \"Learning About Anxiety Disorders. \"  Current as of: July 26, 2016  Content Version: 11.2  © 0221-1645 [email or web address]. Care instructions adapted under license by [employer] (which disclaims liability or warranty for this information). If you have questions about a medical condition or this instruction, always ask your healthcare professional. [address] any warranty or liability for your use of this information.

## 2017-06-10 NOTE — ED NOTES
Pt given ice pack for ankle. Pt states she has arthritis. Pt refusing discharge until ankle is \"numb\". Pt states \"I have rights\".

## 2017-06-10 NOTE — ED NOTES
Pt wheeled with 4 RNs out of ED with discharge instructions and prescriptions in hand given by Dr. Ruiz Booth; pt verbalized understanding of discharge paperwork and time allotted for questions. VSS. Pt alert and oriented.

## 2022-01-05 NOTE — DISCHARGE SUMMARY
Discharge Planning Update:    Hospitalist following. Weaned to 2L. PT/OT. New PMR consult for IPR. Anticipate removal of covid isolation on 1/6. Discharge Summary       PATIENT ID: Anderson Gosselin  MRN: [id number]   YOB: 1955    DATE OF ADMISSION: 4/7/2017  2:28 PM    DATE OF DISCHARGE: 4/10/17  PRIMARY CARE PROVIDER: MICHELLE Ansari     ATTENDING PHYSICIAN: Cole Doll MD, MHS  DISCHARGING PROVIDER: Cole Doll MD, S  To contact this individual call [phone] and ask the  to page. If unavailable ask to be transferred the Adult Hospitalist Department. CONSULTATIONS: IP CONSULT TO HOSPITALIST  IP CONSULT TO PSYCHIATRY    PROCEDURES/SURGERIES: * No surgery found *  4/7 CT head wo contrast    [address] COURSE:   61 yo woman with panic/anxiety disorder, arthritis, HTN, and asthma was BIBEMS from home on 4/717 with dizziness and elevated blood pressure. She was admitted with hypertensive urgency and dizziness.      Hypertensive urgency (POA)  - resolved  - takes Toprol XL 25 mg daily  - started on amlodipine 5, lisinopril 5 here  - will stop lisinopril and increase amlodipine      Dizziness (POA)  - likely multifactorial including hyponatremia, anxiety, uncontrolled HTN  - improved  - check orthostatic vitals  - stop IVF  - PT/OT re-evals today  - likely needs SNF      Chronic panic/anxiety disorder  - continue meds as prescribed  - Psychiatry consulted to recommend suitable regimen: suggested that BZDs were titrated down too quickly; recommended increase Ativan to 0.5 mg TID for now and weaning off Paxil while starting and titrating up Zoloft on an outpatient basis with her psychiatrist      Irritable bladder - stable; ?previous notes report she is on Detrol [place] but not in PTA med list      Hypokalemia (POA) - repleted, resolved       Hyponatremia, recurrent (POA)  - 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 Follow up with PCP and Psychiatry. PENDING TEST RESULTS:   At the time of discharge the following test results are still pending: none    FOLLOW UP APPOINTMENTS:    Follow-up Information     Follow up With Details Comments Contact Info    MICHELLE Ly In 1 week hospital follow up [address] Hoyos MD In 1 week hospital follow up 2006 [facility] Victorino Bennett MD  hospital follow up if want to change psychiatrist [address] [place] [address]  [phone]             ADDITIONAL CARE RECOMMENDATIONS:   1. Take medications as prescribed. 2. Keep appointments as recommended/scheduled. 3. Please talk to your psychiatrist about adjusting your medications. The psychiatrist in the hospital recommended weaning off Paxil while starting and titrating up Zoloft on an outpatient basis. 4. Your psychiatric medications may be contributing to your low sodium levels due to dry mouth causing increased drinking of water. Please talk to your psychiatrist about adjusting your medications if needed. 5. Please check a basic metabolic panel (BMP) in 1 week. DIET: Low fat, Low cholesterol; please restrict water intake to less than 1500 ml per day    ACTIVITY: PT/OT Eval and Treat    WOUND CARE: none    EQUIPMENT needed: as per PT/OT    DISCHARGE MEDICATIONS:  Current Discharge Medication List      START taking these medications    Details   amLODIPine (NORVASC) 10 mg tablet Take 1 Tab by mouth daily. Hold for SBP<100 or DBP<50  Qty: 30 Tab, Refills: 0         CONTINUE these medications which have CHANGED    Details   !! LORazepam (ATIVAN) 0.5 mg tablet Take 1 Tab by mouth three (3) times daily. Max Daily Amount: 1.5 mg.  Qty: 90 Tab, Refills: 0      !!  LORazepam (ATIVAN) 0.5 mg tablet Take 1 Tab by mouth two (2) times daily as needed. Max Daily Amount: 1 mg. Qty: 30 Tab, Refills: 0       !! - Potential duplicate medications found. Please discuss with provider. CONTINUE these medications which have NOT CHANGED    Details   pitavastatin (LIVALO) 2 mg tablet Take 2 mg by mouth nightly. SOD CHLOR,BICARB/SQUEEZ BOTTLE (NEILMED SINUS RINSE COMPLETE NA) by Nasal route two (2) times a day. acetaminophen (TYLENOL) 325 mg tablet Take 325 mg by mouth daily as needed for Pain. raNITIdine hcl 150 mg capsule Take 150 mg by mouth nightly. cetirizine (ZYRTEC) 10 mg tablet Take 10 mg by mouth daily. therapeutic multivitamin (THERAGRAN) tablet Take 1 Tab by mouth daily. PARoxetine (PAXIL) 20 mg tablet Take 20 mg by mouth nightly. montelukast (SINGULAIR) 10 mg tablet Take 10 mg by mouth daily. metoprolol succinate (TOPROL XL) 25 mg XL tablet Take 25 mg by mouth daily. clorazepate (TRANXENE) 7.5 mg tablet Take 3.75 mg by mouth three (3) times daily. STOP taking these medications       mupirocin (BACTROBAN) 2 % ointment Comments:   Reason for Stopping:             NOTIFY YOUR PHYSICIAN FOR ANY OF THE FOLLOWING:   Fever over 101 degrees for 24 hours. Chest pain, shortness of breath, fever, chills, nausea, vomiting, diarrhea, change in mentation, falling, weakness, bleeding. Severe pain or pain not relieved by medications. Or, any other signs or symptoms that you may have questions about.     DISPOSITION:    Home With:   OT  PT    RN      x [facility]    Independent/assisted living    Hospice    Other:       PATIENT CONDITION AT DISCHARGE:     Functional status    Poor    x Deconditioned     Independent      Cognition   x  Lucid     Forgetful     Dementia      Catheters/lines (plus indication)    [place]     PICC     PEG    x None      Code status   x  Full code     DNR      PHYSICAL EXAMINATION AT DISCHARGE:  Visit Vitals    /65    Pulse 72    Temp 99.1 °F (37.3 °C)    Resp 16    Ht 5' 2\" (1.575 m)    Wt 50.5 kg (111 lb 5.3 oz)    SpO2 100%    BMI 20.36 kg/m2     Constitutional: awake, no acute distress, cooperative, pleasant, anxious-appearing   ENT: oral mucous moist, oropharynx benign  Neck supple, no masses   Resp: CTA bilaterally, no wheezing/rhonchi/rales   CV: regular rhythm, normal rate, no m/r/g appreciated, no edema   GI: +BS, soft, non distended, non tender    Musculoskeletal: moves all extremities   Neurologic: AOx3, NFD   Psych: anxious affect  Skin: warm, dry  Eyes: PERRL, EOMI    Labs  Recent Results (from the past 24 hour(s))   METABOLIC PANEL, BASIC    Collection Time: 04/10/17 12:47 AM   Result Value Ref Range    Sodium 131 (L) 136 - 145 mmol/L    Potassium 4.1 3.5 - 5.1 mmol/L    Chloride 98 97 - 108 mmol/L    CO2 26 21 - 32 mmol/L    Anion gap 7 5 - 15 mmol/L    Glucose 87 65 - 100 mg/dL    BUN 5 (L) 6 - 20 MG/DL    Creatinine 0.49 (L) 0.55 - 1.02 MG/DL    BUN/Creatinine ratio 10 (L) 12 - 20      GFR est AA >60 >60 ml/min/1.73m2    GFR est non-AA >60 >60 ml/min/1.73m2    Calcium 8.5 8.5 - 10.1 MG/DL   CBC W/O DIFF    Collection Time: 04/10/17 12:47 AM   Result Value Ref Range    WBC 7.8 3.6 - 11.0 K/uL    RBC 4.68 3.80 - 5.20 M/uL    HGB 13.4 11.5 - 16.0 g/dL    HCT 40.4 35.0 - 47.0 %    MCV 86.3 80.0 - 99.0 FL    MCH 28.6 26.0 - 34.0 PG    MCHC 33.2 30.0 - 36.5 g/dL    RDW 13.2 11.5 - 14.5 %    PLATELET 497 757 - 428 K/uL       CHRONIC MEDICAL DIAGNOSES:  Problem List as of 4/10/2017  Date Reviewed: 4/10/2017          Codes Class Noted - Resolved    Dizziness ICD-10-CM: R42  ICD-9-CM: 780.4  4/10/2017 - Present        Panic anxiety syndrome (Chronic) ICD-10-CM: F41.0  ICD-9-CM: 300.01  4/10/2017 - Present    Overview Signed 4/10/2017 11:10 AM by Catherine Mahajan MD     anxiety and panic attacks             Irritable bladder (Chronic) ICD-10-CM: I05.24  ICD-9-CM: 596.89  4/10/2017 - Present        Anxiety associated with depression ICD-10-CM: F41.8  ICD-9-CM: 300.4  4/8/2017 - Present        Hyponatremia ICD-10-CM: E87.1  ICD-9-CM: 276.1  4/7/2017 - Present        * (Principal)RESOLVED: Hypertensive urgency ICD-10-CM: I16.0  ICD-9-CM: 401.9  4/7/2017 - 4/9/2017        RESOLVED: Hypokalemia ICD-10-CM: E87.6  ICD-9-CM: 276.8  4/7/2017 - 4/9/2017              Greater than 30 minutes were spent with the patient on counseling and coordination of care.     Signed:   Valentina Baig MD  4/10/2017  11:16 AM

## 2024-08-29 NOTE — PROGRESS NOTES
Reviewed events. Sent referral to [facility] where pt had been in the past. Facility has accepted pt for admission today (post 3 midnight stay). Pt's  is at work. She will correspond with him. Pt willing to pay for wheelchair van. Contacted [facility] who will transport at 1:00 pm today. Information to accompany pt:  Discharge instructions/mar/and kardex.  (snf: [facility])  Suhail Navarro, CHANTELW    Care Management Interventions  PCP Verified by CM: Yes (Dr. Michelle Avila.  Gordo Heading)  Mode of Transport at Discharge: [address] (CM Consult): SNF  Partner SNF: Yes  MyChart Signup: No  Discharge Durable Medical Equipment: No  Physical Therapy Consult: Yes  Occupational Therapy Consult: Yes  Speech Therapy Consult: No  Current Support Network: Lives with Spouse Maria M Wilcox: [phone])  Confirm Follow Up Transport: [address] discussed with Pt/Family/Caregiver: Yes  Freedom of Choice Offered: Yes  Discharge Location  Discharge Placement: Skilled nursing facility oriented to person, place and time,  normal sensation